# Patient Record
Sex: MALE | Race: WHITE | NOT HISPANIC OR LATINO | Employment: FULL TIME | URBAN - NONMETROPOLITAN AREA
[De-identification: names, ages, dates, MRNs, and addresses within clinical notes are randomized per-mention and may not be internally consistent; named-entity substitution may affect disease eponyms.]

---

## 2020-03-24 ENCOUNTER — OFFICE VISIT (OUTPATIENT)
Dept: UROLOGY | Facility: CLINIC | Age: 55
End: 2020-03-24

## 2020-03-24 VITALS — TEMPERATURE: 98.4 F | WEIGHT: 255.4 LBS | BODY MASS INDEX: 32.78 KG/M2 | HEIGHT: 74 IN

## 2020-03-24 DIAGNOSIS — R10.30 LOWER ABDOMINAL PAIN: ICD-10-CM

## 2020-03-24 DIAGNOSIS — N50.819 PERSISTENT TESTICULAR PAIN: Primary | ICD-10-CM

## 2020-03-24 DIAGNOSIS — R35.0 FREQUENCY OF URINATION: ICD-10-CM

## 2020-03-24 DIAGNOSIS — N42.9 DISORDER OF PROSTATE: ICD-10-CM

## 2020-03-24 LAB
ANION GAP SERPL CALCULATED.3IONS-SCNC: 16.2 MMOL/L (ref 5–15)
BILIRUB BLD-MCNC: NEGATIVE MG/DL
BUN BLD-MCNC: 12 MG/DL (ref 6–20)
BUN/CREAT SERPL: 14 (ref 7–25)
CALCIUM SPEC-SCNC: 9.3 MG/DL (ref 8.6–10.5)
CHLORIDE SERPL-SCNC: 98 MMOL/L (ref 98–107)
CLARITY, POC: CLEAR
CO2 SERPL-SCNC: 21.8 MMOL/L (ref 22–29)
COLOR UR: YELLOW
CREAT BLD-MCNC: 0.86 MG/DL (ref 0.76–1.27)
GFR SERPL CREATININE-BSD FRML MDRD: 93 ML/MIN/1.73
GLUCOSE BLD-MCNC: 149 MG/DL (ref 65–99)
GLUCOSE UR STRIP-MCNC: NEGATIVE MG/DL
KETONES UR QL: NEGATIVE
LEUKOCYTE EST, POC: NEGATIVE
NITRITE UR-MCNC: NEGATIVE MG/ML
PH UR: 6.5 [PH] (ref 5–8)
POTASSIUM BLD-SCNC: 3.6 MMOL/L (ref 3.5–5.2)
PROT UR STRIP-MCNC: NEGATIVE MG/DL
PSA SERPL-MCNC: 1.59 NG/ML (ref 0–4)
RBC # UR STRIP: NEGATIVE /UL
SODIUM BLD-SCNC: 136 MMOL/L (ref 136–145)
SP GR UR: 1.01 (ref 1–1.03)
UROBILINOGEN UR QL: NORMAL

## 2020-03-24 PROCEDURE — 84153 ASSAY OF PSA TOTAL: CPT | Performed by: NURSE PRACTITIONER

## 2020-03-24 PROCEDURE — 80048 BASIC METABOLIC PNL TOTAL CA: CPT | Performed by: NURSE PRACTITIONER

## 2020-03-24 PROCEDURE — 99204 OFFICE O/P NEW MOD 45 MIN: CPT | Performed by: NURSE PRACTITIONER

## 2020-03-24 PROCEDURE — 51798 US URINE CAPACITY MEASURE: CPT | Performed by: NURSE PRACTITIONER

## 2020-03-24 RX ORDER — HYDROCHLOROTHIAZIDE 12.5 MG/1
12.5 TABLET ORAL
COMMUNITY
Start: 2020-03-12 | End: 2020-06-10

## 2020-03-24 RX ORDER — METHYLPREDNISOLONE 4 MG/1
4 TABLET ORAL
COMMUNITY
Start: 2020-01-15 | End: 2020-11-12

## 2020-03-24 RX ORDER — MONTELUKAST SODIUM 10 MG/1
10 TABLET ORAL DAILY
COMMUNITY
Start: 2020-03-12 | End: 2020-11-12

## 2020-03-24 RX ORDER — METHOCARBAMOL 750 MG/1
750 TABLET, FILM COATED ORAL
COMMUNITY
Start: 2020-01-02 | End: 2020-11-12

## 2020-03-24 RX ORDER — IBUPROFEN 800 MG/1
800 TABLET ORAL
COMMUNITY
Start: 2020-01-02 | End: 2020-11-12

## 2020-03-24 RX ORDER — CYCLOBENZAPRINE HCL 10 MG
10 TABLET ORAL
COMMUNITY
Start: 2020-01-02 | End: 2020-11-12

## 2020-03-24 RX ORDER — FLUCONAZOLE 150 MG/1
150 TABLET ORAL
COMMUNITY
Start: 2020-03-12 | End: 2020-06-10

## 2020-03-24 RX ORDER — LOSARTAN POTASSIUM 50 MG/1
50 TABLET ORAL
COMMUNITY
Start: 2020-03-12 | End: 2020-06-10

## 2020-03-24 NOTE — PROGRESS NOTES
"Chief Complaint:          Chief Complaint   Patient presents with   • Testicle Pain     New patient With Prostate Disorder       Testicular Pain / BPH with Urinary Frequency /Abdominal Pain  HPI:   54 y.o. male.  Patient presents to clinic today with numerous complaints. He is requesting a female provider only.  He has been referred by his primary care for testicular  Pain and groin rash. He states his testicular soreness has been ongoing for a \"long time\", and is intermittent. He states it is worse with  [place], and occassionally with episodes of masturbation.     Secondly, He is requesting a prostate exam and prostate massage. He states it is difficult to reach his prostate if he is not down on his knees. He reports perineal discomfort and reports recurrent prostate infections. He states his prostate is \"Sore\", and feels swollen and irritated He wants it examined. Patient does 'Anal stimulation with objects\".  He states he has rectal spasms, and also anal spasms. He has occasional constipation, which makes it worse. He now takes laxatives occasionally.    Thirdly, he reports lower quadrant abdominal pain, pelvic pain/pressure, which is  Also intermittent with episodes of bloating. He states his abdomen is tender to touch. He denies back pain, flank pain, he denies CVA tenderness.     Fourthly,  He reports excessive urinary frequency and urgency, he has occasional urgency incontinence.  He has nocturia 5-7 times, denies dysuria, or burning on urination.  He does not have gross hematuria, his IPSS score is 20, his PVR is 0.  His urine dipstick is completely negative for any infection.  He reports he has had 3- scrotal ultrasounds, states he has significant calcium deposits on his testicles.    He reports a history of blood vessel inflammation on his penis, that had resolved.  He is relatively healthy otherwise with a past medical history of asthma and arthritis. He has a rash to bilateral armpits, and feet, " with toe Nail fungus that he wants me to look at. Discussed referral to Derm and Podiatry.    We will initiate work-up        Past Medical History:        Past Medical History:   Diagnosis Date   • Hypertension      The following portions of the patient's history were reviewed and updated as appropriate: allergies, current medications, past family history, past medical history, past social history, past surgical history and problem list.    Current Meds:     Current Outpatient Medications   Medication Sig Dispense Refill   • cyclobenzaprine (FLEXERIL) 10 MG tablet 10 mg.     • fluconazole (DIFLUCAN) 150 MG tablet 150 mg.     • hydroCHLOROthiazide (HYDRODIURIL) 12.5 MG tablet 12.5 mg.     • ibuprofen (ADVIL,MOTRIN) 800 MG tablet 800 mg.     • losartan (COZAAR) 50 MG tablet 50 mg.     • methocarbamol (ROBAXIN) 750 MG tablet 750 mg.     • methylPREDNISolone (MEDROL, FLORINA,) 4 MG tablet 4 mg.     • montelukast (SINGULAIR) 10 MG tablet Take 10 mg by mouth Daily. FOR 30 DAYS       No current facility-administered medications for this visit.         Allergies:      Allergies   Allergen Reactions   • Penicillins Hives        Past Surgical History:     History reviewed. No pertinent surgical history.      Social History:     Social History     Socioeconomic History   • Marital status:      Spouse name: Not on file   • Number of children: Not on file   • Years of education: Not on file   • Highest education level: Not on file   Tobacco Use   • Smoking status: Never Smoker   • Smokeless tobacco: Never Used   Substance and Sexual Activity   • Alcohol use: Never     Frequency: Never   • Drug use: Never   • Sexual activity: Defer       Family History:     Family History   Problem Relation Age of Onset   • Heart disease Father    • Hypertension Father    • Heart disease Mother    • Hypertension Mother    • Cancer Mother        Review of Systems:     Review of Systems   Constitutional: Negative for activity change, appetite  change, chills, fatigue and fever.   HENT: Negative for congestion and sinus pressure.    Eyes: Negative for blurred vision and double vision.   Respiratory: Negative for shortness of breath and wheezing.    Gastrointestinal: Positive for abdominal pain and constipation. Negative for diarrhea, nausea and vomiting.   Genitourinary: Positive for frequency and urgency. Negative for difficulty urinating, discharge, dysuria, flank pain, genital sores, hematuria, penile pain, penile swelling, scrotal swelling, testicular pain and urinary incontinence.   Musculoskeletal: Negative for back pain.   Skin: Positive for dry skin, pallor and rash (armpits, foot).   Neurological: Negative for dizziness, weakness and confusion.   Psychiatric/Behavioral: Positive for stress. Negative for agitation, behavioral problems and decreased concentration. The patient is nervous/anxious.         Physical Exam:     Physical Exam   Constitutional: He is oriented to person, place, and time. He appears well-developed and well-nourished. No distress.   HENT:   Head: Normocephalic and atraumatic.   Right Ear: External ear normal.   Left Ear: External ear normal.   Eyes: Pupils are equal, round, and reactive to light. Conjunctivae and EOM are normal. Right eye exhibits no discharge. Left eye exhibits no discharge.   Neck: Normal range of motion. Neck supple. No tracheal deviation present. No thyromegaly present.   Cardiovascular: Normal rate and regular rhythm. Exam reveals no friction rub.   No murmur heard.  Pulmonary/Chest: Effort normal and breath sounds normal. No stridor. No respiratory distress.   Abdominal: Soft. Bowel sounds are normal. He exhibits no distension. There is tenderness. There is no guarding.   Lower quadrant abdominal pain   Genitourinary: Rectum normal, testes normal and penis normal. Rectal exam shows guaiac negative stool. Uncircumcised. No penile tenderness. No discharge found.   Genitourinary Comments: KEISHA negative for  nodules, He has good rectal tone, and large smooth firm prostate. There is no nodularity or any suspicious rectal abnormalities.  Generalised testicular pain/tenderness otherwise normal external genitalia. Bilateral descended testes without any masses, Right slightly swollen and hanging lower than the Leftt testicles. There are no inguinal hernias or abnormalities noted.      Musculoskeletal: Normal range of motion. He exhibits no edema, tenderness or deformity.   Neurological: He is alert and oriented to person, place, and time. No cranial nerve deficit. Coordination normal.   Skin: Skin is warm and dry. Capillary refill takes less than 2 seconds. No pallor.   Psychiatric: He has a normal mood and affect. His behavior is normal. Judgment and thought content normal.       IPSS Questionnaire (AUA-7):  Over the past month…    1)  How often have you had a sensation of not emptying your bladder completely after you finish urinating?  3 - About half the time   2)  How often have you had to urinate again less than two hours after you finished urinating? 3 - About half the time   3)  How often have you found you stopped and started again several times when you urinated?  1 - Less than 1 time in 5   4) How difficult have you found it to postpone urination?  3 - About half the time   5) How often have you had a weak urinary stream?  3 - About half the time   6) How often have you had to push or strain to begin urination?  3 - About half the time   7) How many times did you most typically get up to urinate from the time you went to bed until the time you got up in the morning?  4 - 4 times   Total score:  0-7 mildly symptomatic    8-19 moderately symptomatic    20-35 severely symptomatic                                                    20         Procedure:       Assessment/Plan:     Testicular Pain / BPH with Urinary Frequency /Abdominal Pain: Patient reports to clinic today with numerous complaints.  His IPSS score is 20,  his PVR is 0.  His urine dipstick is completely negative for any infection.  He reports he has had 3- scrotal ultrasounds, states he has significant calcium deposits on his testicles.    He does not have any testicular pain/discomfort today upon exam. He does not have any visible groin rash. He reports this has recently both resolved. Requested a copy of his recent scrotal ultrasound from his primary care for review.    Benign Prostatic Hypertrophy With UrinaryFrequency: Patient reports urinary symptoms that are bothersome to him.  We discussed the pathophysiology of BPH and obstruction. We discussed the static and dynamic effects of BPH as well as using 5 alpha reductase inhibitors versus alpha blockade.  We discussed the indications for transurethral surgery as well.  And/ or other therapeutic options available including all of the newer techniques.  Patient does not have any documented PSA on file, and his KEISHA is negative for nodularity. He however has an enlarged prostate.    Discussed  Starting Flomax, patient wants to wait.    Discussed lower tract and upper tract investigation.     Pt has been scheduled for a CT scan of Abdomen/pelvis with/Without contrast    Patient Has been schedule for a Cystoscopy with Dr. Ward on 04/02/2020    Patient reports that he is not currently experiencing any symptoms of urinary incontinence.    Patient's Body mass index is 32.79 kg/m². BMI is above normal parameters. Recommendations include: educational material, exercise counseling and nutrition counseling.    Smoking Cessation Counseling:  Never a smoker.  Patient does not currently use any tobacco products.       Counseling was given to patient for the following topics diagnostic results including: Testicular pain, abdominal pain, BPH with urinary frequency and impressions as follows: Cystoscopy, abdominal/pelvic CT, repeat scrotal ultrasound.. The interim medical history and current results were reviewed.  A treatment  plan with follow-up was made for Persistent testicular pain [N50.9].            This document has been electronically signed by Griselda Cheng-Akwa, APRN March 25, 2020 09:12

## 2020-03-25 PROBLEM — R10.30 LOWER ABDOMINAL PAIN: Status: ACTIVE | Noted: 2020-03-25

## 2020-03-25 PROBLEM — N42.9 DISORDER OF PROSTATE: Status: ACTIVE | Noted: 2020-03-25

## 2020-03-25 PROBLEM — R35.0 FREQUENCY OF URINATION: Status: ACTIVE | Noted: 2020-03-25

## 2020-03-25 PROBLEM — N50.819 PERSISTENT TESTICULAR PAIN: Status: ACTIVE | Noted: 2020-03-25

## 2020-03-26 NOTE — PATIENT INSTRUCTIONS
Prostatitis    Prostatitis is swelling of the prostate gland. The prostate helps to make semen. It is below a man's bladder, in front of the rectum. There are different types of prostatitis.  Follow these instructions at home:    · Take over-the-counter and prescription medicines only as told by your doctor.  · If you were prescribed an antibiotic medicine, take it as told by your doctor. Do not stop taking the antibiotic even if you start to feel better.  · If your doctor prescribed exercises, do them as directed.  · Take sitz baths as told by your doctor. To take a sitz bath, sit in warm water that is deep enough to cover your hips and butt.  · Keep all follow-up visits as told by your doctor. This is important.  Contact a doctor if:  · Your symptoms get worse.  · You have a fever.  Get help right away if:  · You have chills.  · You feel sick to your stomach (nauseous).  · You throw up (vomit).  · You feel light-headed.  · You feel like you might pass out (faint).  · You cannot pee (urinate).  · You have blood or clumps of blood (blood clots) in your pee (urine).  This information is not intended to replace advice given to you by your health care provider. Make sure you discuss any questions you have with your health care provider.  Document Released: 06/18/2013 Document Revised: 09/07/2017 Document Reviewed: 09/07/2017  [employer] Interactive Patient Education © 2020 [employer]  Urinary Frequency, Adult  Urinary frequency means urinating more often than usual. You may urinate every 1-2 hours even though you drink a normal amount of fluid and do not have a bladder infection or condition. Although you urinate more often than normal, the total amount of urine produced in a day is normal.  With urinary frequency, you may have an urgent need to urinate often. The stress and anxiety of needing to find a bathroom quickly can make this urge worse. This condition may go away on its own or you may need treatment at home.  Home treatment may include bladder training, exercises, taking medicines, or making changes to your diet.  Follow these instructions at home:  Bladder health    · Keep a bladder diary if told by your health care provider. Keep track of:  ? What you eat and drink.  ? How often you urinate.  ? How much you urinate.  · Follow a bladder training program if told by your health care provider. This may include:  ? Learning to delay going to the bathroom.  ? Double urinating (voiding). This helps if you are not completely emptying your bladder.  ? Scheduled voiding.  · Do Kegel exercises as told by your health care provider. Kegel exercises strengthen the muscles that help control urination, which may help the condition.  Eating and drinking  · If told by your health care provider, make diet changes, such as:  ? Avoiding caffeine.  ? Drinking fewer fluids, especially alcohol.  ? Not drinking in the evening.  ? Avoiding foods or drinks that may irritate the bladder. These include coffee, tea, soda, artificial sweeteners, citrus, tomato-based foods, and chocolate.  ? Eating foods that help prevent or ease constipation. Constipation can make this condition worse. Your health care provider may recommend that you:  § Drink enough fluid to keep your urine pale yellow.  § Take over-the-counter or prescription medicines.  § Eat foods that are high in fiber, such as beans, whole grains, and fresh fruits and vegetables.  § Limit foods that are high in fat and processed sugars, such as fried or sweet foods.  General instructions  · Take over-the-counter and prescription medicines only as told by your health care provider.  · Keep all follow-up visits as told by your health care provider. This is important.  Contact a health care provider if:  · You start urinating more often.  · You feel pain or irritation when you urinate.  · You notice blood in your urine.  · Your urine looks cloudy.  · You develop a fever.  · You begin  vomiting.  Get help right away if:  · You are unable to urinate.  Summary  · Urinary frequency means urinating more often than usual. With urinary frequency, you may urinate every 1-2 hours even though you drink a normal amount of fluid and do not have a bladder infection or other bladder condition.  · Your health care provider may recommend that you keep a bladder diary, follow a bladder training program, or make dietary changes.  · If told by your health care provider, do Kegel exercises to strengthen the muscles that help control urination.  · Take over-the-counter and prescription medicines only as told by your health care provider.  · Contact a health care provider if your symptoms do not improve or get worse.  This information is not intended to replace advice given to you by your health care provider. Make sure you discuss any questions you have with your health care provider.  Document Released: 10/14/2010 Document Revised: 06/27/2019 Document Reviewed: 06/27/2019  [employer] Interactive Patient Education © 2020 [employer]  Benign Prostatic Hyperplasia    Benign prostatic hyperplasia (BPH) is an enlarged prostate gland that is caused by the normal aging process and not by cancer. The prostate is a walnut-sized gland that is involved in the production of semen. It is located in front of the rectum and below the bladder. The bladder stores urine and the urethra is the tube that carries the urine out of the body. The prostate may get bigger as a man gets older.  An enlarged prostate can press on the urethra. This can make it harder to pass urine. The build-up of urine in the bladder can cause infection. Back pressure and infection may progress to bladder damage and kidney (renal) failure.  What are the causes?  This condition is part of a normal aging process. However, not all men develop problems from this condition. If the prostate enlarges away from the urethra, urine flow will not be blocked. If it  enlarges toward the urethra and compresses it, there will be problems passing urine.  What increases the risk?  This condition is more likely to develop in men over the age of 50 years.  What are the signs or symptoms?  Symptoms of this condition include:  · Getting up often during the night to urinate.  · Needing to urinate frequently during the day.  · Difficulty starting urine flow.  · Decrease in size and strength of your urine stream.  · Leaking (dribbling) after urinating.  · Inability to pass urine. This needs immediate treatment.  · Inability to completely empty your bladder.  · Pain when you pass urine. This is more common if there is also an infection.  · Urinary tract infection (UTI).  How is this diagnosed?  This condition is diagnosed based on your medical history, a physical exam, and your symptoms. Tests will also be done, such as:  · A post-void bladder scan. This measures any amount of urine that may remain in your bladder after you finish urinating.  · A digital rectal exam. In a rectal exam, your health care provider checks your prostate by putting a lubricated, gloved finger into your rectum to feel the back of your prostate gland. This exam detects the size of your gland and any abnormal lumps or growths.  · An exam of your urine (urinalysis).  · A prostate specific antigen (PSA) screening. This is a blood test used to screen for prostate cancer.  · An ultrasound. This test uses sound waves to electronically produce a picture of your prostate gland.  Your health care provider may refer you to a specialist in kidney and prostate diseases (urologist).  How is this treated?  Once symptoms begin, your health care provider will monitor your condition (active surveillance or watchful waiting). Treatment for this condition will depend on the severity of your condition. Treatment may include:  · Observation and yearly exams. This may be the only treatment needed if your condition and symptoms are  mild.  · Medicines to relieve your symptoms, including:  ? Medicines to shrink the prostate.  ? Medicines to relax the muscle of the prostate.  · Surgery in severe cases. Surgery may include:  ? Prostatectomy. In this procedure, the prostate tissue is removed completely through an open incision or with a laparoscope or robotics.  ? Transurethral resection of the prostate (TURP). In this procedure, a tool is inserted through the opening at the tip of the penis (urethra). It is used to cut away tissue of the inner core of the prostate. The pieces are removed through the same opening of the penis. This removes the blockage.  ? Transurethral incision (TUIP). In this procedure, small cuts are made in the prostate. This lessens the prostate's pressure on the urethra.  ? Transurethral microwave thermotherapy (TUMT). This procedure uses microwaves to create heat. The heat destroys and removes a small amount of prostate tissue.  ? Transurethral needle ablation (TUNA). This procedure uses radio frequencies to destroy and remove a small amount of prostate tissue.  ? Interstitial laser coagulation (ILC). This procedure uses a laser to destroy and remove a small amount of prostate tissue.  ? Transurethral electrovaporization (TUVP). This procedure uses electrodes to destroy and remove a small amount of prostate tissue.  ? Prostatic urethral lift. This procedure inserts an implant to push the lobes of the prostate away from the urethra.  Follow these instructions at home:  · Take over-the-counter and prescription medicines only as told by your health care provider.  · Monitor your symptoms for any changes. Contact your health care provider with any changes.  · Avoid drinking large amounts of liquid before going to bed or out in public.  · Avoid or reduce how much caffeine or alcohol you drink.  · Give yourself time when you urinate.  · Keep all follow-up visits as told by your health care provider. This is important.  Contact a  health care provider if:  · You have unexplained back pain.  · Your symptoms do not get better with treatment.  · You develop side effects from the medicine you are taking.  · Your urine becomes very dark or has a bad smell.  · Your lower abdomen becomes distended and you have trouble passing your urine.  Get help right away if:  · You have a fever or chills.  · You suddenly cannot urinate.  · You feel lightheaded, or very dizzy, or you faint.  · There are large amounts of blood or clots in the urine.  · Your urinary problems become hard to manage.  · You develop moderate to severe low back or flank pain. The flank is the side of your body between the ribs and the hip.  These symptoms may represent a serious problem that is an emergency. Do not wait to see if the symptoms will go away. Get medical help right away. Call your local emergency services (911 in the [place] Do not drive yourself to the hospital.  Summary  · Benign prostatic hyperplasia (BPH) is an enlarged prostate that is caused by the normal aging process and not by cancer.  · An enlarged prostate can press on the urethra. This can make it hard to pass urine.  · This condition is part of a normal aging process and is more likely to develop in men over the age of 50 years.  · Get help right away if you suddenly cannot urinate.  This information is not intended to replace advice given to you by your health care provider. Make sure you discuss any questions you have with your health care provider.  Document Released: 12/18/2006 Document Revised: 11/12/2019 Document Reviewed: 01/22/2018  [employer] Interactive Patient Education © 2020 [employer]

## 2020-03-27 ENCOUNTER — APPOINTMENT (OUTPATIENT)
Dept: CT IMAGING | Facility: HOSPITAL | Age: 55
End: 2020-03-27

## 2020-04-10 ENCOUNTER — TELEPHONE (OUTPATIENT)
Dept: UROLOGY | Facility: CLINIC | Age: 55
End: 2020-04-10

## 2020-04-14 NOTE — TELEPHONE ENCOUNTER
Got an e-mail message back from scheduling team in [place] and it was approved and they are going to contact patient to get him scheduled.

## 2020-05-05 ENCOUNTER — HOSPITAL ENCOUNTER (OUTPATIENT)
Dept: CT IMAGING | Facility: HOSPITAL | Age: 55
Discharge: HOME | End: 2020-05-05
Admitting: NURSE PRACTITIONER

## 2020-05-05 DIAGNOSIS — R10.30 LOWER ABDOMINAL PAIN: ICD-10-CM

## 2020-05-05 DIAGNOSIS — N42.9 DISORDER OF PROSTATE: ICD-10-CM

## 2020-05-05 PROCEDURE — 0 IOVERSOL 68 % SOLUTION: Performed by: NURSE PRACTITIONER

## 2020-05-05 PROCEDURE — 74178 CT ABD&PLV WO CNTR FLWD CNTR: CPT | Performed by: RADIOLOGY

## 2020-05-05 PROCEDURE — 74178 CT ABD&PLV WO CNTR FLWD CNTR: CPT

## 2020-05-05 RX ADMIN — IOVERSOL 100 ML: 678 INJECTION INTRA-ARTERIAL; INTRAVENOUS at 13:15

## 2020-05-07 ENCOUNTER — TELEPHONE (OUTPATIENT)
Dept: UROLOGY | Facility: CLINIC | Age: 55
End: 2020-05-07

## 2020-05-11 ENCOUNTER — TELEPHONE (OUTPATIENT)
Dept: UROLOGY | Facility: CLINIC | Age: 55
End: 2020-05-11

## 2020-05-11 NOTE — TELEPHONE ENCOUNTER
Patient called to review CT scan results. We discussed the presence of bilateral non obstructing stones, and a fatty liver.     Pt reports increased abdominal distention and bloating with each meal. Discussed a GI referral.    Pt appreciative of care. Will call with any questions.  shanta Newby

## 2020-05-26 ENCOUNTER — OFFICE VISIT (OUTPATIENT)
Dept: GASTROENTEROLOGY | Facility: CLINIC | Age: 55
End: 2020-05-26

## 2020-05-26 VITALS
WEIGHT: 263.4 LBS | TEMPERATURE: 97.9 F | BODY MASS INDEX: 33.8 KG/M2 | OXYGEN SATURATION: 97 % | DIASTOLIC BLOOD PRESSURE: 96 MMHG | HEART RATE: 81 BPM | SYSTOLIC BLOOD PRESSURE: 180 MMHG | HEIGHT: 74 IN

## 2020-05-26 DIAGNOSIS — R19.4 CHANGE IN BOWEL HABITS: ICD-10-CM

## 2020-05-26 DIAGNOSIS — R68.81 EARLY SATIETY: ICD-10-CM

## 2020-05-26 DIAGNOSIS — R10.84 GENERALIZED ABDOMINAL PAIN: Primary | ICD-10-CM

## 2020-05-26 DIAGNOSIS — R14.0 BLOATING: ICD-10-CM

## 2020-05-26 DIAGNOSIS — L29.0 PRURITUS ANI: ICD-10-CM

## 2020-05-26 DIAGNOSIS — K76.0 FATTY LIVER: ICD-10-CM

## 2020-05-26 DIAGNOSIS — K63.89 SMALL INTESTINAL BACTERIAL OVERGROWTH: ICD-10-CM

## 2020-05-26 PROCEDURE — 99243 OFF/OP CNSLTJ NEW/EST LOW 30: CPT | Performed by: PHYSICIAN ASSISTANT

## 2020-05-26 RX ORDER — POLYETHYLENE GLYCOL 3350 17 G/17G
17 POWDER, FOR SOLUTION ORAL AS NEEDED
COMMUNITY
End: 2022-11-28

## 2020-05-26 RX ORDER — NITAZOXANIDE 500 MG/1
500 TABLET ORAL 2 TIMES DAILY WITH MEALS
Qty: 20 TABLET | Refills: 0 | Status: SHIPPED | OUTPATIENT
Start: 2020-05-26 | End: 2020-06-05

## 2020-05-26 NOTE — PROGRESS NOTES
": 1965    Chief Complaint   Patient presents with   • Abdominal Pain       Mukesh Mccord is a 55 y.o. male who presents to the office today as a consultation from Griselda Cheng-Akwa, APRN for evaluation of Abdominal Pain.    History of Present Illness:  Over the past 6 weeks ago, he noticed abdominal pain starting in the lower abdomen and now has moved to generalized abdomen. His bowel movements are described as occurring daily, but feels that he has air in sections which causes him to be uncomfortable. Has large amount of air with his bowel movements and has some relief when it passes. He points to #6 on the Kelly Stool Scale. No rectal bleeding. Has been told in the past that he has internal hemorrhoids. He admits leakage of feces after a \"hard\" bowel movement throughout the day but denies fecal urgency with full incontinence. Has intermittent rectal itching. Takes miralax only as needed for constipation. He has never had EGD, fearful of sedation. Denies nausea or vomiting. Has heartburn, he feels that it is becoming better now with TUMs as needed, only occurs intermittently not daily. Has bloating and eats much less than previous, has early satiety. Eating a small meal makes him feel very full. Did have stool testing at his PCPs office recently which was negative.     New finding of fatty liver on recent CT scan. No personal history of other liver diseases. He does not have diabetes or elevated cholesterol per his report. He does not drink alcohol and has never had alcoholism.     Last colonoscopy was 5 years ago by Dr. Sharma which showed colon polyps. There is no known family history of colon cancer or colon polyps. There is some family history of stomach cancer, paternal grandfather.     Review of Systems   Constitutional: Positive for activity change, fatigue and unexpected weight change. Negative for chills.   HENT: Negative for sore throat and trouble swallowing.    Eyes: Negative.  "   Respiratory: Positive for shortness of breath. Negative for cough and chest tightness.    Cardiovascular: Positive for leg swelling.   Gastrointestinal: Positive for abdominal distention, abdominal pain, constipation, nausea and rectal pain. Negative for anal bleeding, blood in stool, diarrhea and vomiting.   Endocrine: Negative.    Genitourinary: Positive for difficulty urinating, frequency, hematuria and penile pain.   Musculoskeletal: Positive for back pain and neck pain.   Skin: Positive for rash.   Allergic/Immunologic: Negative for environmental allergies and food allergies.   Neurological: Negative for dizziness and headaches.   Hematological: Bruises/bleeds easily.   Psychiatric/Behavioral: Positive for agitation and sleep disturbance. The patient is nervous/anxious.      I have reviewed and confirmed the accuracy of the ROS as documented by the MA/LPN/RN Carmina Toribio PA-C    Past Medical History:   Diagnosis Date   • Hypertension        Past Surgical History:   Procedure Laterality Date   • COLONOSCOPY  2015    Dr. jack- colon polyps, did not have sedation due to fear       Family History   Problem Relation Age of Onset   • Heart disease Father    • Hypertension Father    • Heart disease Mother    • Hypertension Mother    • Cancer Mother        Social History     Socioeconomic History   • Marital status:      Spouse name: Not on file   • Number of children: Not on file   • Years of education: Not on file   • Highest education level: Not on file   Tobacco Use   • Smoking status: Never Smoker   • Smokeless tobacco: Never Used   Substance and Sexual Activity   • Alcohol use: Never     Frequency: Never   • Drug use: Never   • Sexual activity: Defer       Current Outpatient Medications:   •  diclofenac (VOLTAREN) 1 % gel gel, Apply 4 g topically to the appropriate area as directed 4 (Four) Times a Day As Needed., Disp: , Rfl:   •  fluconazole (DIFLUCAN) 150 MG tablet, 150 mg., Disp: , Rfl:   •   "hydroCHLOROthiazide (HYDRODIURIL) 12.5 MG tablet, 12.5 mg., Disp: , Rfl:   •  ibuprofen (ADVIL,MOTRIN) 800 MG tablet, 800 mg., Disp: , Rfl:   •  losartan (COZAAR) 50 MG tablet, 50 mg., Disp: , Rfl:   •  methocarbamol (ROBAXIN) 750 MG tablet, 750 mg., Disp: , Rfl:   •  metroNIDAZOLE (METROCREAM) 0.75 % cream, Apply  topically to the appropriate area as directed 2 (Two) Times a Day., Disp: , Rfl:   •  montelukast (SINGULAIR) 10 MG tablet, Take 10 mg by mouth Daily. FOR 30 DAYS, Disp: , Rfl:   •  mupirocin (BACTROBAN) 2 % ointment, Apply  topically to the appropriate area as directed 3 (Three) Times a Day., Disp: , Rfl:   •  cyclobenzaprine (FLEXERIL) 10 MG tablet, 10 mg., Disp: , Rfl:   •  methylPREDNISolone (MEDROL, FLORINA,) 4 MG tablet, 4 mg., Disp: , Rfl:     Allergies:   Penicillins    Vitals:  /96   Pulse 81   Temp 97.9 °F (36.6 °C)   Ht 188 cm (74\")   Wt 119 kg (263 lb 6.4 oz)   SpO2 97%   BMI 33.82 kg/m²     Physical Exam   Constitutional: He is oriented to person, place, and time. He appears well-developed and well-nourished. No distress.   HENT:   Head: Normocephalic and atraumatic.   Wearing a mask   Eyes: Conjunctivae are normal. Right eye exhibits no discharge. Left eye exhibits no discharge. No scleral icterus.   Neck: Normal range of motion. No JVD present.   Cardiovascular: Normal rate, regular rhythm and normal heart sounds. Exam reveals no gallop and no friction rub.   No murmur heard.  Pulmonary/Chest: Effort normal and breath sounds normal. No respiratory distress. He has no wheezes. He has no rales. He exhibits no tenderness.   Abdominal: Soft. Bowel sounds are normal. He exhibits no mass. There is no tenderness.   Obese   Musculoskeletal: Normal range of motion. He exhibits no edema or deformity.   Neurological: He is alert and oriented to person, place, and time. Coordination normal.   Skin: Skin is warm and dry. No rash noted. He is not diaphoretic. No erythema.   Psychiatric: His " behavior is normal. Judgment and thought content normal.   Anxious affect   Vitals reviewed.    Results Review:  Labs 2/2020: H/H normal, WBC normal, plt 170,000, ALT 37, AST 21, Bilirubin 0.4, Alk phos 93, Acute hepatitis panel negative, Hgb A1c 5.5, glucose 117, GFR 88.     CT abd/pelv 5/5/2020: fatty liver, GI normal, bilateral nephrolithiasis without obstruction.     Assessment:  1. Generalized abdominal pain    2. Bloating    3. Early satiety    4. Change in bowel habits    5. Pruritus ani    6. Small intestinal bacterial overgrowth    7. Fatty liver      Plan:  Offered EGD and colonoscopy for evaluation of complaints and because he has history of colon polyps. He would like to think about this.    Due to non-specific GI complaints and recent normal CT scan of the abdomen and pelvis, I have recommended that he have treatment for suspected SIBO with Alinia as below. He will also start daily probiotic, culturelle samples/coupons given.     Will monitor pruritus ani, no severe complaints now, only intermittent, may need anusol cream in future.    We will obtain records from Dr. Sharma for review of past colonoscopy and pathology.    Mukesh Leonhart was advised of the importance of weight loss due to finding of fatty liver infiltration. Body mass index is 33 and his goal would be a BMI of 25 or less. He was advised to lose 10% of his body weight over the next 6 months. This is to be accomplished with a healthy diet (hypocaloric diet) and exercise. Replace fat with complex carbs and work on increasing fiber in the diet with more whole grains, fruits and vegetables. He was informed of the importance of good control of his glucose and cholesterol to help decrease his fatty liver infiltrate. Avoid all alcohol intake. He was advised that fatty infiltrate of the liver is not a benign condition and can lead to serious liver disease, even cirrhosis, possible liver transplant and hepatocellular carcinoma. He will have  ultrasound of the liver again if liver enzymes increase and hepatic panel every 6 months for monitoring. Most recent liver enzymes normal.     New Medications Ordered This Visit   Medications   • nitazoxanide (Alinia) 500 MG tablet     Sig: Take 1 tablet by mouth 2 (Two) Times a Day With Meals for 10 days.     Dispense:  20 tablet     Refill:  0           Return in about 1 month (around 6/26/2020) for recheck abdominal pain.      Electronically signed 5/26/2020 10:06  Carmina Toribio PA-C, [facility]

## 2020-06-01 NOTE — TELEPHONE ENCOUNTER
SPOKE WITH PATIENT RELATEDS TO URINE CULTURE RESULTS AND LABS. NO CONCERNS AT THIS TIME  VIRI PERKINS

## 2020-06-09 ENCOUNTER — TELEPHONE (OUTPATIENT)
Dept: UROLOGY | Facility: CLINIC | Age: 55
End: 2020-06-09

## 2020-06-09 NOTE — TELEPHONE ENCOUNTER
Pt states that he has a rash on his scrotum and anal area. Requesting generic Diflucan be called  into [facility] in [place].     Pts number is [phone]

## 2020-06-10 ENCOUNTER — OFFICE VISIT (OUTPATIENT)
Dept: UROLOGY | Facility: CLINIC | Age: 55
End: 2020-06-10

## 2020-06-10 VITALS — WEIGHT: 264 LBS | BODY MASS INDEX: 33.88 KG/M2 | HEIGHT: 74 IN | TEMPERATURE: 98.6 F

## 2020-06-10 DIAGNOSIS — R35.0 FREQUENCY OF URINATION: ICD-10-CM

## 2020-06-10 DIAGNOSIS — N50.819 PERSISTENT TESTICULAR PAIN: Primary | ICD-10-CM

## 2020-06-10 DIAGNOSIS — B37.49 YEAST DERMATITIS OF PENIS: ICD-10-CM

## 2020-06-10 LAB
BILIRUB BLD-MCNC: NEGATIVE MG/DL
CLARITY, POC: CLEAR
COLOR UR: YELLOW
GLUCOSE UR STRIP-MCNC: NEGATIVE MG/DL
KETONES UR QL: NEGATIVE
LEUKOCYTE EST, POC: NEGATIVE
NITRITE UR-MCNC: NEGATIVE MG/ML
PH UR: 6.5 [PH] (ref 5–8)
PROT UR STRIP-MCNC: NEGATIVE MG/DL
RBC # UR STRIP: NEGATIVE /UL
SP GR UR: 1.02 (ref 1–1.03)
UROBILINOGEN UR QL: NORMAL

## 2020-06-10 PROCEDURE — 99214 OFFICE O/P EST MOD 30 MIN: CPT | Performed by: NURSE PRACTITIONER

## 2020-06-10 RX ORDER — FLUCONAZOLE 100 MG/1
100 TABLET ORAL DAILY
Qty: 5 TABLET | Refills: 0 | Status: SHIPPED | OUTPATIENT
Start: 2020-06-10 | End: 2020-06-15

## 2020-06-10 RX ORDER — LOSARTAN POTASSIUM AND HYDROCHLOROTHIAZIDE 12.5; 5 MG/1; MG/1
1 TABLET ORAL DAILY
COMMUNITY
Start: 2020-04-09

## 2020-06-10 NOTE — PROGRESS NOTES
Chief Complaint:          Chief Complaint   Patient presents with   • Testicle Pain     3 mnth f/u       HPI:   55 y.o. male.  Patient presents to clinic today for follow-up.    He states he is doing relatively better, and he reports his groin pain is now a dull ache  Which is inconsistent. He reports an occasional  testicular ache and he feels a lot of friction when he walks or during activity.  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  Overall his testicular pain is better as he does not masturbate often.    He also reports perineal rash which has been yeas tlike.  He has numerous antifungal creams prescribed by his primary care which he is not using because he constantly soiled his undergarments and sheets.  He is requesting a p.o. Diflucan, as he has had better results with that compared to the creams.  He says most often he has to use his fan to air his perineal area and for comfort.    Patient is also requesting a prostate massage.  He denies having any more urinary symptoms of frequency urgency, dysuria or burning on urination.  He denies gross hematuria, chills or fevers, he denies back pain, abdominal pain.  His urine dipstick today is completely negative for any infection. His IPSS score is 3      Past Medical History:        Past Medical History:   Diagnosis Date   • Hypertension      The following portions of the patient's history were reviewed and updated as appropriate: allergies, current medications, past family history, past medical history, past social history, past surgical history and problem list.    Current Meds:     Current Outpatient Medications   Medication Sig Dispense Refill   • cyclobenzaprine (FLEXERIL) 10 MG tablet 10 mg.     • diclofenac (VOLTAREN) 1 % gel gel Apply 4 g topically to the appropriate area as directed 4 (Four) Times a Day As Needed.     •  ibuprofen (ADVIL,MOTRIN) 800 MG tablet 800 mg.     • losartan-hydrochlorothiazide (HYZAAR) 50-12.5 MG per tablet Take 1 tablet by mouth Daily. FOR 30 DAYS     • methocarbamol (ROBAXIN) 750 MG tablet 750 mg.     • methylPREDNISolone (MEDROL, FLORINA,) 4 MG tablet 4 mg.     • metroNIDAZOLE (METROCREAM) 0.75 % cream Apply  topically to the appropriate area as directed 2 (Two) Times a Day.     • montelukast (SINGULAIR) 10 MG tablet Take 10 mg by mouth Daily. FOR 30 DAYS     • mupirocin (BACTROBAN) 2 % ointment Apply  topically to the appropriate area as directed 3 (Three) Times a Day.     • polyethylene glycol (MIRALAX) 17 GM/SCOOP powder Take 17 g by mouth As Needed for Constipation.     • fluconazole (Diflucan) 100 MG tablet Take 1 tablet by mouth Daily for 5 days. 5 tablet 0     No current facility-administered medications for this visit.         Allergies:      Allergies   Allergen Reactions   • Penicillins Hives        Past Surgical History:     Past Surgical History:   Procedure Laterality Date   • COLONOSCOPY  2015    Dr. jack- colon polyps, did not have sedation due to fear         Social History:     Social History     Socioeconomic History   • Marital status:      Spouse name: Not on file   • Number of children: Not on file   • Years of education: Not on file   • Highest education level: Not on file   Tobacco Use   • Smoking status: Never Smoker   • Smokeless tobacco: Never Used   Substance and Sexual Activity   • Alcohol use: Never     Frequency: Never   • Drug use: Never   • Sexual activity: Defer       Family History:     Family History   Problem Relation Age of Onset   • Heart disease Father    • Hypertension Father    • Heart disease Mother    • Hypertension Mother    • Cancer Mother        Review of Systems:    Review of Systems   Constitutional: Negative for activity change, appetite change, chills, fatigue and fever.   HENT: Negative for congestion and sinus pressure.    Eyes: Negative for blurred  vision and double vision.   Respiratory: Negative for shortness of breath and wheezing.    Gastrointestinal: Negative for abdominal pain, constipation, diarrhea, nausea and vomiting.   Genitourinary: Positive for flank pain and testicular pain. Negative for difficulty urinating, discharge, dysuria, frequency, genital sores, hematuria, penile pain, penile swelling, scrotal swelling, urgency and urinary incontinence.   Musculoskeletal: Negative for back pain.   Neurological: Negative for dizziness, weakness and confusion.   Psychiatric/Behavioral: Positive for stress. Negative for agitation, behavioral problems and decreased concentration.      IPSS Questionnaire (AUA-7):  Over the past month…    1)  How often have you had a sensation of not emptying your bladder completely after you finish urinating?  1 - Less than 1 time in 5   2)  How often have you had to urinate again less than two hours after you finished urinating? 1 - Less than 1 time in 5   3)  How often have you found you stopped and started again several times when you urinated?  0 - Not at all   4) How difficult have you found it to postpone urination?  0 - Not at all   5) How often have you had a weak urinary stream?  0 - Not at all   6) How often have you had to push or strain to begin urination?  0 - Not at all   7) How many times did you most typically get up to urinate from the time you went to bed until the time you got up in the morning?  1 - 1 time   Total score:  0-7 mildly symptomatic                                                            3    8-19 moderately symptomatic    20-35 severely symptomatic       Physical Exam:     Physical Exam   Constitutional: He is oriented to person, place, and time. He appears well-developed and well-nourished. He appears distressed.   HENT:   Head: Normocephalic and atraumatic.   Right Ear: External ear normal.   Left Ear: External ear normal.   Eyes: Pupils are equal, round, and reactive to light.  Conjunctivae and EOM are normal. Right eye exhibits no discharge. Left eye exhibits no discharge.   Neck: Normal range of motion. Neck supple. No tracheal deviation present. No thyromegaly present.   Cardiovascular: Normal rate and regular rhythm. Exam reveals no friction rub.   No murmur heard.  Pulmonary/Chest: Effort normal and breath sounds normal. No stridor. No respiratory distress.   Abdominal: Soft. Bowel sounds are normal. He exhibits no distension. There is tenderness. There is no guarding.   Genitourinary: Rectum normal, testes normal and penis normal. Rectal exam shows guaiac negative stool. Uncircumcised. No penile tenderness. No discharge found.   Genitourinary Comments: Perineal rash     Musculoskeletal: Normal range of motion. He exhibits tenderness. He exhibits no edema or deformity.   Neurological: He is alert and oriented to person, place, and time. No cranial nerve deficit. Coordination normal.   Skin: Skin is warm and dry. Capillary refill takes less than 2 seconds. No pallor.   Psychiatric: He has a normal mood and affect. His behavior is normal. Judgment and thought content normal.       Procedure:     No notes on file      Assessment:     Encounter Diagnoses   Name Primary?   • Persistent testicular pain Yes   • Yeast dermatitis of penis    • Frequency of urination      Orders Placed This Encounter   Procedures   • POC Urinalysis Dipstick, Automated       Plan:     Testicular Pain / BPH with Urinary Frequency : Patient reports to clinic today in no apparent distress.  His IPSS score is 3, significantly improved from last time which was 20. His urine dipstick is completely negative for any infection.  He reports he has had 3- scrotal ultrasounds, states he has significant calcium deposits on his testicles.    Patient denies having any urinary symptoms recently.  His urine dipstick is completely negative for any infection.  Patient last cystoscopy and CT scan were unremarkable.    He does  have  only mild/tolerable testicular pain/discomfort today upon exam. He does not have any visible groin rash. He reports this has recently both resolved.  Patient however still requesting some Diflucan.    Overall he reports doing relatively well.    Discussed continues Flomax daily.    Diflucan 100 100 mg p.o. x3 days then stop.      Discussed using nystatin powder for his perineal rash    We will see him seen back in 1 month for follow-up.    Agreeable to plan of care.     Patient's Body mass index is 33.9 kg/m². BMI is above normal parameters. Recommendations include: educational material, exercise counseling and nutrition counseling.    Smoking Cessation Counseling:  Never a smoker.  Patient does not currently use any tobacco products.     Counseling was given to patient for the following topics diagnostic results including: Testicular pain, BPH, instructions for management as follows: Continue Flomax daily as prescribed, increase p.o. fluid intake 1 to 2 L daily Diflucan 100 mg as needed nystatin to perineal rash, and risk factor reductions including: Bladder irritants such as caffeine products, masturbation. The interim medical history and current results were reviewed.  A treatment plan with follow-up was made for Persistent testicular pain [N50.9].   .           This document has been electronically signed by Griselda Cheng-Akwa, APRN June 11, 2020 21:56

## 2020-07-09 ENCOUNTER — OFFICE VISIT (OUTPATIENT)
Dept: UROLOGY | Facility: CLINIC | Age: 55
End: 2020-07-09

## 2020-07-09 VITALS — TEMPERATURE: 98.3 F | HEIGHT: 74 IN | WEIGHT: 259 LBS | BODY MASS INDEX: 33.24 KG/M2

## 2020-07-09 DIAGNOSIS — N52.9 ERECTILE DYSFUNCTION, UNSPECIFIED ERECTILE DYSFUNCTION TYPE: Primary | ICD-10-CM

## 2020-07-09 DIAGNOSIS — R35.0 URINE FREQUENCY: ICD-10-CM

## 2020-07-09 PROCEDURE — 99214 OFFICE O/P EST MOD 30 MIN: CPT | Performed by: NURSE PRACTITIONER

## 2020-07-09 RX ORDER — OXYBUTYNIN CHLORIDE 5 MG/1
5 TABLET ORAL DAILY
Qty: 30 TABLET | Refills: 3 | Status: SHIPPED | OUTPATIENT
Start: 2020-07-09 | End: 2020-08-08

## 2020-07-09 RX ORDER — SILDENAFIL CITRATE 20 MG/1
20 TABLET ORAL DAILY
Qty: 24 TABLET | Refills: 6 | Status: SHIPPED | OUTPATIENT
Start: 2020-07-09 | End: 2020-08-26

## 2020-07-09 NOTE — PROGRESS NOTES
"Chief Complaint:          Chief Complaint   Patient presents with   • Erectile Dysfunction     1 Month follow Up Testicle Pain       HPI:   55 y.o. male.  Patient presents for his One month follow up Testicle Pain. He reports he feels better and his discomfort has resolved. He still has \"prostate Spasms\"  And bladder spasms and is now taking muscle relaxer's from his primary care which has been very helpful. Overall, He states he is doing relatively better, and he reports his groin pain is now  Negligible dull ache which is inconsistent.     He reports an occasional  testicular discomfort and he feels a lot of friction when he walks or during activity.  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  Overall his testicular pain is better as he does not masturbate often.      He now has new concerns of Erectile Dysfunction and would like some Viagra. He has urinary frequency, urgency, and reports urinary leakage at times. He has Nocturia 4-5 x. He denies having any more dysuria or burning on urination.  He denies gross hematuria, chills or fevers, he denies back pain, abdominal pain.  His urine dipstick today is completely negative for any infection. His IPSS score is 14.       Past Medical History:        Past Medical History:   Diagnosis Date   • Hypertension          Current Meds:     Current Outpatient Medications   Medication Sig Dispense Refill   • cyclobenzaprine (FLEXERIL) 10 MG tablet 10 mg.     • diclofenac (VOLTAREN) 1 % gel gel Apply 4 g topically to the appropriate area as directed 4 (Four) Times a Day As Needed.     • ibuprofen (ADVIL,MOTRIN) 800 MG tablet 800 mg.     • losartan-hydrochlorothiazide (HYZAAR) 50-12.5 MG per tablet Take 1 tablet by mouth Daily. FOR 30 DAYS     • methocarbamol (ROBAXIN) 750 MG tablet 750 mg.     • methylPREDNISolone (MEDROL, FLORINA,) 4 MG tablet 4 mg.     • " metroNIDAZOLE (METROCREAM) 0.75 % cream Apply  topically to the appropriate area as directed 2 (Two) Times a Day.     • montelukast (SINGULAIR) 10 MG tablet Take 10 mg by mouth Daily. FOR 30 DAYS     • mupirocin (BACTROBAN) 2 % ointment Apply  topically to the appropriate area as directed 3 (Three) Times a Day.     • polyethylene glycol (MIRALAX) 17 GM/SCOOP powder Take 17 g by mouth As Needed for Constipation.     • oxybutynin (DITROPAN) 5 MG tablet Take 1 tablet by mouth Daily for 30 days. 30 tablet 3   • sildenafil (REVATIO) 20 MG tablet Take 1 tablet by mouth Daily for 48 doses. May take up to 5 by mouth one hour prior to intercourse on an empty stomach 24 tablet 6     No current facility-administered medications for this visit.         Allergies:      Allergies   Allergen Reactions   • Penicillins Hives        Past Surgical History:     Past Surgical History:   Procedure Laterality Date   • COLONOSCOPY  2015    Dr. jack- colon polyps, did not have sedation due to fear         Social History:     Social History     Socioeconomic History   • Marital status:      Spouse name: Not on file   • Number of children: Not on file   • Years of education: Not on file   • Highest education level: Not on file   Tobacco Use   • Smoking status: Never Smoker   • Smokeless tobacco: Never Used   Substance and Sexual Activity   • Alcohol use: Never     Frequency: Never   • Drug use: Never   • Sexual activity: Defer       Family History:     Family History   Problem Relation Age of Onset   • Heart disease Father    • Hypertension Father    • Heart disease Mother    • Hypertension Mother    • Cancer Mother        Review of Systems:     Review of Systems   Constitutional: Negative for chills and fever.   HENT: Negative for sinus pressure.    Respiratory: Negative for cough.    Cardiovascular: Negative for leg swelling.   Gastrointestinal: Positive for abdominal pain and rectal pain.   Genitourinary: Positive for frequency,  scrotal swelling and testicular pain. Negative for dysuria and urgency.   Musculoskeletal: Negative for back pain.   Neurological: Negative for headaches.   Psychiatric/Behavioral: The patient is not nervous/anxious.      IPSS Questionnaire (AUA-7):  Over the past month…    1)  How often have you had a sensation of not emptying your bladder completely after you finish urinating?  2 - Less than half the time   2)  How often have you had to urinate again less than two hours after you finished urinating? 2 - Less than half the time   3)  How often have you found you stopped and started again several times when you urinated?  2 - Less than half the time   4) How difficult have you found it to postpone urination?  2 - Less than half the time   5) How often have you had a weak urinary stream?  1 - Less than 1 time in 5   6) How often have you had to push or strain to begin urination?  0 - Not at all   7) How many times did you most typically get up to urinate from the time you went to bed until the time you got up in the morning?  5 - 5+ times   Total score:  0-7 mildly symptomatic    8-19 moderately symptomatic ------------------------------ 14    20-35 severely symptomatic     Physical Exam:     Physical Exam   Constitutional: He is oriented to person, place, and time. He appears well-developed and well-nourished. No distress.   HENT:   Head: Normocephalic and atraumatic.   Right Ear: External ear normal.   Left Ear: External ear normal.   Eyes: Pupils are equal, round, and reactive to light. Conjunctivae and EOM are normal. Right eye exhibits no discharge. Left eye exhibits no discharge.   Neck: Normal range of motion. Neck supple. No tracheal deviation present. No thyromegaly present.   Cardiovascular: Normal rate and regular rhythm. Exam reveals no friction rub.   No murmur heard.  Pulmonary/Chest: Effort normal and breath sounds normal. No stridor. No respiratory distress.   Abdominal: Soft. Bowel sounds are normal.  He exhibits distension. There is tenderness. There is no guarding.   Genitourinary: Rectum normal, testes normal and penis normal. Rectal exam shows guaiac negative stool. Uncircumcised. No penile tenderness. No discharge found.   Musculoskeletal: Normal range of motion. He exhibits tenderness. He exhibits no edema or deformity.   Neurological: He is alert and oriented to person, place, and time. No cranial nerve deficit. Coordination normal.   Skin: Skin is warm and dry. Capillary refill takes less than 2 seconds. No pallor.   Psychiatric: He has a normal mood and affect. His behavior is normal. Judgment and thought content normal.       Procedure:       Assessment/Plan:   Groin Pain /Erectile Dysfunction/frequency/urine frequency: Patient presents to clinic for follow-up today.  He states his testicular discomfort is very manageable.  He reports erectile dysfunction and has become bothersome to him.  We discussed the anatomy and physiology of the penis and the endothelium.  We discussed the various forms of erectile dysfunction including peripheral vascular occlusive disease, Postoperative, secondary to radiation treatments of the prostate, and arterial inflow.      We discussed the various treatment options available including oral medication and its various forms.  We discussed the use of both generic and non-generic Viagra.  We discussed Cialis and a longer half-life of 17 hours as well as the other 2 medications. We discussed cost involved with this including the fact that the generic is much cheaper but if taken has multiple pills because they are 20 mg dosages.  We did discuss the other alternatives including Penile injections, vacuum erection devices and surgical intervention reserved for only the most severe cases.  We discussed the need for testosterone in about 20% of cases of erectile dysfunction.      The patient desires Viagra to treat his erectile dysfunction. History and physical exam has not  disclosed any obvious treatable cause of this complaint. He is informed that Viagra is sometimes not covered by insurance. It is available on a fee-for-service cost basis, and is relatively expensive. He can start with 50 mg dose, and increase to 100 mg if necessary. The method of use 1 hour prior to anticipated intercourse is explained. He should not use any more than one tablet in a 24 hour period. The side effects of possible headache, flushing, dyspepsia and transient changes in vision have been explained.     The patient is not taking nitrates, and denies he has access to nitrates in any form at any time. I have counseled him that taking Viagra with nitrates of any form can cause death. Additionally, Viagra serum concentrations can be increased by the following: cimetidine, erythromycin, itraconazole or ketoconazole. This patient does not take these drugs, but I have counseled him to avoid Viagra if he does take any of these.    We have also discussed the fact that there have been some deaths in patients after taking Viagra, felt due to the exertion of intercourse rather than the drug itself. The patient is aware of this, and accepts whatever unknown degree of risk there is in this aspect.    Will start Sildenafil 20 mg daily(maximum 100 mg) in 24 hours one hour prior to intercourse on an empty stomach.    Oxybutunin 5 mg daily for Urinary frequency and incontinence,    Patient has been scheduled for Cystoscopy on 09/03/20 with Dr. Ward.    Patient reports that he is currently experiencing symptoms of urinary incontinence.    Patient's Body mass index is 33.25 kg/m². BMI is above normal parameters. Recommendations include: educational material, exercise counseling and nutrition counseling.              This document has been electronically signed by Griselda Cheng-Akwa, ARNP July 9, 2020 14:02

## 2020-07-10 NOTE — PATIENT INSTRUCTIONS
Sildenafil injection  What is this medicine?  SILDENAFIL (yane DEN a vinita) is used to treat pulmonary arterial hypertension. This is a serious heart and lung condition. This medicine helps to improve symptoms and quality of life.  This medicine may be used for other purposes; ask your health care provider or pharmacist if you have questions.  COMMON BRAND NAME(S): Revatio  What should I tell my health care provider before I take this medicine?  They need to know if you have any of these conditions:  · anatomical deformation of the penis, Peyronie's disease, or history of priapism (painful and prolonged erection)  · bleeding disorders  · eye disease, vision problems  · heart disease  · high or low blood pressure  · history of blood diseases, like sickle cell anemia or leukemia  · kidney disease  · liver disease  · pulmonary veno-occlusive disease (PVOD)  · stomach ulcer  · an unusual or allergic reaction to sildenafil, other medicines, foods, dyes, or preservatives  · pregnant or trying to get pregnant  · breast-feeding  How should I use this medicine?  This medicine is for injection into a vein. It is given by a health care professional in a hospital or clinic setting.  Talk to your pediatrician regarding the use of this medicine in children. This medicine is not approved for use in children.  Overdosage: If you think you have taken too much of this medicine contact a poison control center or emergency room at once.  NOTE: This medicine is only for you. Do not share this medicine with others.  What if I miss a dose?  It is important not to miss your dose. Call your doctor or health care professional if you are unable to keep an appointment.  What may interact with this medicine?  Do not take this medicine with any of the following medications:  · cisapride  · cobicistat  · nitrates like amyl nitrite, isosorbide dinitrate, isosorbide mononitrate, nitroglycerin  · riociguat  · telaprevir  This medicine may also interact  with the following medications:  · antiviral medicines for HIV or AIDS  · bosentan  · certain medicines for benign prostatic hyperplasia (BPH)  · certain medicines for blood pressure  · certain medicines for fungal infections like ketoconazole and itraconazole  · cimetidine  · erythromycin  · rifampin  This list may not describe all possible interactions. Give your health care provider a list of all the medicines, herbs, non-prescription drugs, or dietary supplements you use. Also tell them if you smoke, drink alcohol, or use illegal drugs. Some items may interact with your medicine.  What should I watch for while using this medicine?  Tell your doctor or healthcare professional if your symptoms do not start to get better or if they get worse.  Tell your doctor or health care professional right away if you have any change in your eyesight or hearing.  You may get dizzy. Do not drive, use machinery, or do anything that needs mental alertness until you know how this medicine affects you. Do not stand or sit up quickly, especially if you are an older patient. This reduces the risk of dizzy or fainting spells. Avoid alcoholic drinks; they can make you more dizzy.  What side effects may I notice from receiving this medicine?  Side effects that you should report to your doctor or health care professional as soon as possible:  · allergic reactions like skin rash, itching or hives, swelling of the face, lips, or tongue  · breathing problems  · changes in vision  · chest pain  · decreased hearing  · fast, irregular heartbeat  · men: prolonged or painful erection (lasting more than 4 hours)  Side effects that usually do not require medical attention (report to your doctor or health care professional if they continue or are bothersome):  · facial flushing  · headache  · nosebleed  · trouble sleeping  · upset stomach  This list may not describe all possible side effects. Call your doctor for medical advice about side effects.  You may report side effects to FDA at 6-875-FDA-6211.  Where should I keep my medicine?  This drug is given in a hospital or clinic and will not be stored at home.  NOTE: This sheet is a summary. It may not cover all possible information. If you have questions about this medicine, talk to your doctor, pharmacist, or health care provider.  © 2020 [employer]vier/Gold Standard (2017-01-19 09:15:46)  Erectile Dysfunction  Erectile dysfunction (ED) is the inability to get or keep an erection in order to have sexual intercourse. Erectile dysfunction may include:  · Inability to get an erection.  · Lack of enough hardness of the erection to allow penetration.  · Loss of the erection before sex is finished.  What are the causes?  This condition may be caused by:  · Certain medicines, such as:  ? Pain relievers.  ? Antihistamines.  ? Antidepressants.  ? Blood pressure medicines.  ? Water pills (diuretics).  ? Ulcer medicines.  ? Muscle relaxants.  ? Drugs.  · Excessive drinking.  · Psychological causes, such as:  ? Anxiety.  ? Depression.  ? Sadness.  ? Exhaustion.  ? Performance fear.  ? Stress.  · Physical causes, such as:  ? Artery problems. This may include diabetes, smoking, liver disease, or atherosclerosis.  ? High blood pressure.  ? Hormonal problems, such as low testosterone.  ? Obesity.  ? Nerve problems. This may include back or pelvic injuries, diabetes mellitus, multiple sclerosis, or Parkinson disease.  What are the signs or symptoms?  Symptoms of this condition include:  · Inability to get an erection.  · Lack of enough hardness of the erection to allow penetration.  · Loss of the erection before sex is finished.  · Normal erections at some times, but with frequent unsatisfactory episodes.  · Low sexual satisfaction in either partner due to erection problems.  · A curved penis occurring with erection. The curve may cause pain or the penis may be too curved to allow for intercourse.  · Never having nighttime  erections.  How is this diagnosed?  This condition is often diagnosed by:  · Performing a physical exam to find other diseases or specific problems with the penis.  · Asking you detailed questions about the problem.  · Performing blood tests to check for diabetes mellitus or to measure hormone levels.  · Performing other tests to check for underlying health conditions.  · Performing an ultrasound exam to check for scarring.  · Performing a test to check blood flow to the penis.  · Doing a sleep study at home to measure nighttime erections.  How is this treated?  This condition may be treated by:  · Medicine taken by mouth to help you achieve an erection (oral medicine).  · Hormone replacement therapy to replace low testosterone levels.  · Medicine that is injected into the penis. Your health care provider may instruct you how to give yourself these injections at home.  · Vacuum pump. This is a pump with a ring on it. The pump and ring are placed on the penis and used to create pressure that helps the penis become erect.  · Penile implant surgery. In this procedure, you may receive:  ? An inflatable implant. This consists of cylinders, a pump, and a reservoir. The cylinders can be inflated with a fluid that helps to create an erection, and they can be deflated after intercourse.  ? A semi-rigid implant. This consists of two silicone rubber rods. The rods provide some rigidity. They are also flexible, so the penis can both curve downward in its normal position and become straight for sexual intercourse.  · Blood vessel surgery, to improve blood flow to the penis. During this procedure, a blood vessel from a different part of the body is placed into the penis to allow blood to flow around (bypass) damaged or blocked blood vessels.  · Lifestyle changes, such as exercising more, losing weight, and quitting smoking.  Follow these instructions at home:  Medicines    · Take over-the-counter and prescription medicines only  as told by your health care provider. Do not increase the dosage without first discussing it with your health care provider.  · If you are using self-injections, perform injections as directed by your health care provider. Make sure to avoid any veins that are on the surface of the penis. After giving an injection, apply pressure to the injection site for 5 minutes.  General instructions  · Exercise regularly, as directed by your health care provider. Work with your health care provider to lose weight, if needed.  · Do not use any products that contain nicotine or tobacco, such as cigarettes and e-cigarettes. If you need help quitting, ask your health care provider.  · Before using a vacuum pump, read the instructions that come with the pump and discuss any questions with your health care provider.  · Keep all follow-up visits as told by your health care provider. This is important.  Contact a health care provider if:  · You feel nauseous.  · You vomit.  Get help right away if:  · You are taking oral or injectable medicines and you have an erection that lasts longer than 4 hours. If your health care provider is unavailable, go to the nearest emergency room for evaluation. An erection that lasts much longer than 4 hours can result in permanent damage to your penis.  · You have severe pain in your groin or abdomen.  · You develop redness or severe swelling of your penis.  · You have redness spreading up into your groin or lower abdomen.  · You are unable to urinate.  · You experience chest pain or a rapid heart beat (palpitations) after taking oral medicines.  Summary  · Erectile dysfunction (ED) is the inability to get or keep an erection during sexual intercourse. This problem can usually be treated successfully.  · This condition is diagnosed based on a physical exam, your symptoms, and tests to determine the cause. Treatment varies depending on the cause, and may include medicines, hormone therapy, surgery, or  vacuum pump.  · You may need follow-up visits to make sure that you are using your medicines or devices correctly.  · Get help right away if you are taking or injecting medicines and you have an erection that lasts longer than 4 hours.  This information is not intended to replace advice given to you by your health care provider. Make sure you discuss any questions you have with your health care provider.  Document Released: 12/15/2001 Document Revised: 11/30/2018 Document Reviewed: 01/03/2018  Elsevier Patient Education © 2020 [employer]

## 2020-09-24 ENCOUNTER — TRANSCRIBE ORDERS (OUTPATIENT)
Dept: ADMINISTRATIVE | Facility: HOSPITAL | Age: 55
End: 2020-09-24

## 2020-09-24 DIAGNOSIS — Z01.818 OTHER SPECIFIED PRE-OPERATIVE EXAMINATION: Primary | ICD-10-CM

## 2020-09-28 ENCOUNTER — LAB (OUTPATIENT)
Dept: LAB | Facility: HOSPITAL | Age: 55
End: 2020-09-28

## 2020-10-22 ENCOUNTER — PROCEDURE VISIT (OUTPATIENT)
Dept: UROLOGY | Facility: CLINIC | Age: 55
End: 2020-10-22

## 2020-10-22 VITALS — HEIGHT: 74 IN | BODY MASS INDEX: 33.24 KG/M2 | WEIGHT: 259 LBS | TEMPERATURE: 98.9 F

## 2020-10-22 DIAGNOSIS — N41.1 CHRONIC PROSTATITIS: ICD-10-CM

## 2020-10-22 DIAGNOSIS — Z79.2 PROPHYLACTIC ANTIBIOTIC: ICD-10-CM

## 2020-10-22 DIAGNOSIS — R35.0 FREQUENCY OF URINATION: Primary | ICD-10-CM

## 2020-10-22 PROCEDURE — 96372 THER/PROPH/DIAG INJ SC/IM: CPT | Performed by: UROLOGY

## 2020-10-22 PROCEDURE — 99213 OFFICE O/P EST LOW 20 MIN: CPT | Performed by: UROLOGY

## 2020-10-22 PROCEDURE — 52000 CYSTOURETHROSCOPY: CPT | Performed by: UROLOGY

## 2020-10-22 RX ORDER — TAMSULOSIN HYDROCHLORIDE 0.4 MG/1
1 CAPSULE ORAL NIGHTLY
Qty: 30 CAPSULE | Refills: 5 | Status: SHIPPED | OUTPATIENT
Start: 2020-10-22 | End: 2021-03-10

## 2020-10-22 RX ORDER — SULFAMETHOXAZOLE AND TRIMETHOPRIM 800; 160 MG/1; MG/1
1 TABLET ORAL 2 TIMES DAILY
Qty: 84 TABLET | Refills: 2 | Status: SHIPPED | OUTPATIENT
Start: 2020-10-22 | End: 2020-12-28

## 2020-10-22 RX ORDER — GENTAMICIN SULFATE 40 MG/ML
80 INJECTION, SOLUTION INTRAMUSCULAR; INTRAVENOUS ONCE
Status: COMPLETED | OUTPATIENT
Start: 2020-10-22 | End: 2020-10-22

## 2020-10-22 RX ADMIN — GENTAMICIN SULFATE 80 MG: 40 INJECTION, SOLUTION INTRAMUSCULAR; INTRAVENOUS at 10:34

## 2020-10-22 NOTE — PROGRESS NOTES
Chief Complaint:          Chief Complaint   Patient presents with   • cystoscopy       HPI:   55 y.o. male.  Who has recurrent, recalcitrant, chronic prostatitis that is been doing extensive prostate massage unsuccessfully.  He now presents for lower tract investigation.  He has significant urinary frequency.    Past Medical History:        Past Medical History:   Diagnosis Date   • Hypertension          Current Meds:     Current Outpatient Medications   Medication Sig Dispense Refill   • cyclobenzaprine (FLEXERIL) 10 MG tablet 10 mg.     • diclofenac (VOLTAREN) 1 % gel gel Apply 4 g topically to the appropriate area as directed 4 (Four) Times a Day As Needed.     • ibuprofen (ADVIL,MOTRIN) 800 MG tablet 800 mg.     • losartan-hydrochlorothiazide (HYZAAR) 50-12.5 MG per tablet Take 1 tablet by mouth Daily. FOR 30 DAYS     • methocarbamol (ROBAXIN) 750 MG tablet 750 mg.     • methylPREDNISolone (MEDROL, FLORINA,) 4 MG tablet 4 mg.     • metroNIDAZOLE (METROCREAM) 0.75 % cream Apply  topically to the appropriate area as directed 2 (Two) Times a Day.     • montelukast (SINGULAIR) 10 MG tablet Take 10 mg by mouth Daily. FOR 30 DAYS     • mupirocin (BACTROBAN) 2 % ointment Apply  topically to the appropriate area as directed 3 (Three) Times a Day.     • polyethylene glycol (MIRALAX) 17 GM/SCOOP powder Take 17 g by mouth As Needed for Constipation.       No current facility-administered medications for this visit.         Allergies:      Allergies   Allergen Reactions   • Penicillins Hives        Past Surgical History:     Past Surgical History:   Procedure Laterality Date   • COLONOSCOPY  2015    Dr. jack- colon polyps, did not have sedation due to fear         Social History:     Social History     Socioeconomic History   • Marital status:      Spouse name: Not on file   • Number of children: Not on file   • Years of education: Not on file   • Highest education level: Not on file   Tobacco Use   • Smoking status:  Never Smoker   • Smokeless tobacco: Never Used   Substance and Sexual Activity   • Alcohol use: Never     Frequency: Never   • Drug use: Never   • Sexual activity: Defer       Family History:     Family History   Problem Relation Age of Onset   • Heart disease Father    • Hypertension Father    • Heart disease Mother    • Hypertension Mother    • Cancer Mother        Review of Systems:     Review of Systems   Constitutional: Negative.    HENT: Negative.    Eyes: Negative.    Respiratory: Negative.    Cardiovascular: Negative.    Gastrointestinal: Negative.    Endocrine: Negative.    Genitourinary: Positive for difficulty urinating, dysuria, frequency and urgency.   Musculoskeletal: Negative.    Allergic/Immunologic: Negative.    Neurological: Negative.    Hematological: Negative.    Psychiatric/Behavioral: Negative.        Physical Exam:     Physical Exam  Vitals signs and nursing note reviewed.   Constitutional:       Appearance: He is well-developed.   HENT:      Head: Normocephalic and atraumatic.   Eyes:      Conjunctiva/sclera: Conjunctivae normal.      Pupils: Pupils are equal, round, and reactive to light.   Neck:      Musculoskeletal: Normal range of motion.   Cardiovascular:      Rate and Rhythm: Normal rate and regular rhythm.      Heart sounds: Normal heart sounds.   Pulmonary:      Effort: Pulmonary effort is normal.      Breath sounds: Normal breath sounds.   Abdominal:      General: Bowel sounds are normal.      Palpations: Abdomen is soft.   Genitourinary:     Penis: Normal.       Scrotum/Testes: Normal.   Musculoskeletal: Normal range of motion.   Skin:     General: Skin is warm and dry.   Neurological:      Mental Status: He is alert and oriented to person, place, and time.      Deep Tendon Reflexes: Reflexes are normal and symmetric.   Psychiatric:         Behavior: Behavior normal.         Thought Content: Thought content normal.         Judgment: Judgment normal.         I have reviewed the  following portions of the patient's history: allergies, current medications, past family history, past medical history, past social history, past surgical history, problem list and ROS and confirm it's accurate.      Procedure:   Cystoscopy:  Patient presents today for cystourethroscopy.  I went ahead and obtained an informed consent including the risk of anesthesia, bleeding, infection, etc.  After prep and drape in a sterile fashion in the low dorsal lithotomy position the urethra was gently anesthetized with 10 cc of 2% viscous Xylocaine jelly.  After an appropriate period of topical anesthesia I used the Olympus digital 14 [place] flexible cystoscope to examine the anterior urethra which was completely normal, the ureteral orifices were visualized and normal in position and configuration there were no stones, tumors or foreign bodies.  The urethra was normal, there was extensive polypoid prostatitis in the fossa there was no visual obstruction the patient was given 80 mg of gentamicin in an intramuscular fashion  as prophylaxis for the cystoscopy and released from the clinic.    Assessment/Plan:   Prostatitis-we discussed the differential diagnosis of prostatitis including the National Institutes of Health classification system I through IV.  I discussed that type I prostatitis is  acute bacterial prostatitis and an associated with high fevers typically hematuria and severe pain with voiding.  We discussed the fact that it necessitates 6 weeks of chronic antibiotics to be sure it doesn't turn into a chronic bacterial prostatitis that can have lifelong ramifications.  We discussed National Institutes of Health type II chronic bacterial prostatitis.  We discussed the fact that this a chronic bacterial infection of the prostate that often requires suppressive antibiotics.  We discussed type III being related more to nonspecific urethritis as well as tight for being related to finding inflammation and a biopsy in the  absence of symptomatology.  I discussed the diagnostic strategies including the VB 1 through 4 urine culture and discussed empiric therapy.  I emphasized that with the use of chronic antibiotics I strongly recommended the concomitant use of probiotics.  Additionally, we discussed the various antibiotics used and the risks and benefits associated with each particularly the use of long-term ciprofloxacin and the effect on joints and collagen in human beings.  He has obvious prostatitis I placed him on 6 weeks of antibiotics I will see him back based on this I also recommended an alpha-blocker to facilitate voiding            Patient's Body mass index is 33.24 kg/m². BMI is above normal parameters. Recommendations include: educational material.              This document has been electronically signed by WILDA ESPINO MD October 22, 2020 09:17 EDT

## 2020-10-23 PROBLEM — N41.1 CHRONIC PROSTATITIS: Status: ACTIVE | Noted: 2020-10-23

## 2020-11-12 ENCOUNTER — OFFICE VISIT (OUTPATIENT)
Dept: UROLOGY | Facility: CLINIC | Age: 55
End: 2020-11-12

## 2020-11-12 VITALS — TEMPERATURE: 97.9 F | WEIGHT: 255.6 LBS | BODY MASS INDEX: 32.8 KG/M2 | HEIGHT: 74 IN

## 2020-11-12 DIAGNOSIS — N41.1 CHRONIC PROSTATITIS: Primary | ICD-10-CM

## 2020-11-12 DIAGNOSIS — R35.0 URINE FREQUENCY: ICD-10-CM

## 2020-11-12 DIAGNOSIS — N40.1 BPH WITH OBSTRUCTION/LOWER URINARY TRACT SYMPTOMS: ICD-10-CM

## 2020-11-12 DIAGNOSIS — N13.8 BPH WITH OBSTRUCTION/LOWER URINARY TRACT SYMPTOMS: ICD-10-CM

## 2020-11-12 LAB
BILIRUB BLD-MCNC: NEGATIVE MG/DL
CLARITY, POC: CLEAR
COLOR UR: YELLOW
GLUCOSE UR STRIP-MCNC: NEGATIVE MG/DL
KETONES UR QL: NEGATIVE
LEUKOCYTE EST, POC: NEGATIVE
NITRITE UR-MCNC: NEGATIVE MG/ML
PH UR: 6.5 [PH] (ref 5–8)
PROT UR STRIP-MCNC: NEGATIVE MG/DL
RBC # UR STRIP: NEGATIVE /UL
SP GR UR: 1.01 (ref 1–1.03)
UROBILINOGEN UR QL: NORMAL

## 2020-11-12 PROCEDURE — 99214 OFFICE O/P EST MOD 30 MIN: CPT | Performed by: NURSE PRACTITIONER

## 2020-11-12 NOTE — PROGRESS NOTES
Chief Complaint:          Chief Complaint   Patient presents with   • Chronic Prostatitis /BPH with Urine Frequency      3 Week Follow UP       HPI:   55 y.o. male.  With a significant history of BPH with urine frequency, recurrent chronic prostatitis which has been recalcitrant to multiple antibiotic therapy, and extensive unsuccessful prostate massage returns to clinic today for follow-up, earlier than his scheduled appointment in 6 weeks.    Patient had a cystoscopy 3 weeks ago by Dr. 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  He was placed on antibiotic Bactrim for 6 weeks.  He reports minimal improvement in his symptoms, he reports he would like his prostate checked again today to see if the swelling is improved, and he would also like a prostate massage.    Explained to patient it takes takes a while for chronic prostatitis to be resolved as this may take up to 6 months to a year given his multiple use of different antibiotic therapy.  His urine dipstick today is completely negative for any infection, his PVR is 0.  His IPSS score is 9.      Past Medical History:        Past Medical History:   Diagnosis Date   • Chronic prostatitis 10/23/2020   • Hypertension        The following portions of the patient's history were reviewed and updated as appropriate: allergies, current medications, past family history, past medical history, past social history, past surgical history and problem list.    Current Meds:     Current Outpatient Medications   Medication Sig Dispense Refill   • cyclobenzaprine (FLEXERIL) 10 MG tablet 10 mg.     • diclofenac (VOLTAREN) 1 % gel gel Apply 4 g topically to the appropriate area as directed 4 (Four) Times a Day As Needed.     • losartan-hydrochlorothiazide (HYZAAR) 50-12.5 MG per tablet Take 1 tablet by mouth Daily. FOR 30 DAYS     • polyethylene glycol (MIRALAX) 17 GM/SCOOP powder Take 17 g by mouth As Needed for Constipation.      • sulfamethoxazole-trimethoprim (Bactrim DS) 800-160 MG per tablet Take 1 tablet by mouth 2 (Two) Times a Day. 84 tablet 2   • tamsulosin (Flomax) 0.4 MG capsule 24 hr capsule Take 1 capsule by mouth Every Night. 30 capsule 5   • ibuprofen (ADVIL,MOTRIN) 800 MG tablet 800 mg.     • methocarbamol (ROBAXIN) 750 MG tablet 750 mg.     • methylPREDNISolone (MEDROL, FLORINA,) 4 MG tablet 4 mg.     • metroNIDAZOLE (METROCREAM) 0.75 % cream Apply  topically to the appropriate area as directed 2 (Two) Times a Day.     • montelukast (SINGULAIR) 10 MG tablet Take 10 mg by mouth Daily. FOR 30 DAYS     • mupirocin (BACTROBAN) 2 % ointment Apply  topically to the appropriate area as directed 3 (Three) Times a Day.       No current facility-administered medications for this visit.         Allergies:      Allergies   Allergen Reactions   • Penicillins Hives        Past Surgical History:     Past Surgical History:   Procedure Laterality Date   • COLONOSCOPY  2015    Dr. jack- colon polyps, did not have sedation due to fear         Social History:     Social History     Socioeconomic History   • Marital status:      Spouse name: Not on file   • Number of children: Not on file   • Years of education: Not on file   • Highest education level: Not on file   Tobacco Use   • Smoking status: Never Smoker   • Smokeless tobacco: Never Used   Substance and Sexual Activity   • Alcohol use: Never     Frequency: Never   • Drug use: Never   • Sexual activity: Defer       Family History:     Family History   Problem Relation Age of Onset   • Heart disease Father    • Hypertension Father    • Heart disease Mother    • Hypertension Mother    • Cancer Mother        Review of Systems:    Review of Systems   Constitutional: Negative for activity change, appetite change, chills, fatigue and fever.   HENT: Positive for postnasal drip, rhinorrhea and sinus pressure. Negative for congestion.    Eyes: Negative for blurred vision and double vision.      Respiratory: Negative for shortness of breath and wheezing.    Gastrointestinal: Positive for abdominal distention, constipation and rectal pain. Negative for abdominal pain, diarrhea, nausea and vomiting.   Genitourinary: Positive for frequency, nocturia, erectile dysfunction, urgency and urinary incontinence. Negative for difficulty urinating, discharge, dysuria, flank pain, genital sores, hematuria, penile pain, penile swelling, scrotal swelling and testicular pain.   Musculoskeletal: Negative for back pain.   Neurological: Negative for dizziness, weakness and confusion.   Psychiatric/Behavioral: Positive for stress. Negative for agitation, behavioral problems and decreased concentration.      IPSS Questionnaire (AUA-7):  Over the past month…    1)  How often have you had a sensation of not emptying your bladder completely after you finish urinating?  1 - Less than 1 time in 5   2)  How often have you had to urinate again less than two hours after you finished urinating? 3 - About half the time   3)  How often have you found you stopped and started again several times when you urinated?  0 - Not at all   4) How difficult have you found it to postpone urination?  1 - Less than 1 time in 5   5) How often have you had a weak urinary stream?  0 - Not at all   6) How often have you had to push or strain to begin urination?  1 - Less than 1 time in 5   7) How many times did you most typically get up to urinate from the time you went to bed until the time you got up in the morning?  4 - 4 times   Total score:  0-7 mildly symptomatic    8-19 moderately symptomatic                                          9    20-35 severely symptomatic       Physical Exam:     Physical Exam  Constitutional:       General: He is not in acute distress.     Appearance: He is well-developed.   HENT:      Head: Normocephalic and atraumatic.      Right Ear: External ear normal.      Left Ear: External ear normal.   Eyes:      General:          Right eye: No discharge.         Left eye: No discharge.      Conjunctiva/sclera: Conjunctivae normal.      Pupils: Pupils are equal, round, and reactive to light.   Neck:      Musculoskeletal: Normal range of motion and neck supple.      Thyroid: No thyromegaly.      Trachea: No tracheal deviation.   Cardiovascular:      Rate and Rhythm: Normal rate and regular rhythm.      Heart sounds: No murmur. No friction rub.   Pulmonary:      Effort: Pulmonary effort is normal. No respiratory distress.      Breath sounds: Normal breath sounds. No stridor.   Abdominal:      General: Bowel sounds are normal. There is no distension.      Palpations: Abdomen is soft.      Tenderness: There is no abdominal tenderness. There is no guarding.   Genitourinary:     Penis: Normal and uncircumcised. No tenderness or discharge.       Scrotum/Testes: Normal.      Rectum: Normal. Guaiac result negative.   Musculoskeletal: Normal range of motion.         General: Tenderness present. No deformity.   Skin:     General: Skin is warm and dry.      Capillary Refill: Capillary refill takes less than 2 seconds.      Coloration: Skin is not pale.   Neurological:      Mental Status: He is alert and oriented to person, place, and time.      Cranial Nerves: No cranial nerve deficit.      Coordination: Coordination normal.   Psychiatric:         Behavior: Behavior normal.         Thought Content: Thought content normal.         Judgment: Judgment normal.         Procedure:     No notes on file      Assessment:     Encounter Diagnoses   Name Primary?   • Chronic prostatitis Yes   • BPH with obstruction/lower urinary tract symptoms    • Urine frequency      Orders Placed This Encounter   Procedures   • Bladder Scan   • POC Urinalysis Dipstick, Automated       Plan:        ASSESSMENT  BPH with Urine Frequency /Chronic Prostatitis : He returns to clinic today for follow-up earlier than his scheduled appointment date.  He reports he will want his prostate  rechecked after cystoscopy to see if his prostate enlargement is improved.  Explained to patient antibiotics take at least 6 weeks sometimes 6 months for chronic prostatitis to resolve.     Again, we discussed the pathophysiology of BPH and obstruction.  We discussed the static and dynamic effect effects of BPH as well as using 5 alpha reductase inhibitors versus alpha blockade.  We discussed the indications for transurethral surgery as well.  And/ or other therapeutic options available including all of the newer techniques.  He has no real symptomatology referable to his prostate other than very mild enlargement.  Also there was no visual obstruction as examined via cystoscopy.    Again, We discussed the differential diagnosis of prostatitis including the National Institutes of Health classification system I through IV.  I discussed that type I prostatitis as  acute bacterial prostatitis and an associated with high fevers typically hematuria and severe pain with voiding. We discussed the fact that it necessitates 6 weeks of chronic antibiotics to be sure it doesn't turn into a chronic bacterial prostatitis that can have lifelong ramifications.  We discussed National Institutes of Health type II chronic bacterial prostatitis.  We discussed the fact that this a chronic bacterial infection of the prostate that often requires suppressive antibiotics.    We discussed type III being related more to nonspecific urethritis as well as tight for being related to finding inflammation and a biopsy in the absence of symptomatology.  I discussed the diagnostic strategies including the VB 1 through 4 urine culture and discussed empiric therapy. Additionally, we discussed the various antibiotics used and the risks and benefits associated with each particularly the use of long-term ciprofloxacin and the effect on joints and collagen in human beings       PLAN  Continue Bactrim DS BID X 6 weeks     I emphasized that with the use of  chronic antibiotics I strongly recommended the concomitant use of probiotics.       Rather than proceed with an expensive workup he doesn't need, I strongly recommend he continue his Flomax 0.4 mg daily as directed.    Discussed some home remedies for acute prostatitis and encouraged patient to engage in taking warm showers or baths as tolerated for comfort, avoiding activities that put pressure on the prostate such as bicycling, sitting on hard surfaces for longer.  Encourage sitting on a donut or  cushion, avoiding bladder irritants such as caffeine, alcohol, reducing or avoiding consumption of spicy foods, and increasing p.o. fluid intake to at least 2 to 3 L of water daily    See him back in 6 weeks for follow-up, to reevaluate his symptoms at that time    Patient is agreeable plan of care    Patient's Body mass index is 32.8 kg/m². BMI is above normal parameters. Recommendations include: educational material, exercise counseling and nutrition counseling.    Smoking Cessation Counseling:  Never a smoker.  Patient does not currently use any tobacco products.     Counseling was given to patient for the following topics diagnostic results including: Chronic prostatitis, BPH and urine frequency, instructions for management as follows: Increase p.o. fluid intake, Bactrim DS twice daily x6 weeks, continue Flomax, prostate massage, and risk factor reductions including: Bladder irritants such as caffeine products, coffee, tea, citrusy/spicy foods.. The interim medical history and current results were reviewed.  A treatment plan with follow-up was made FOR BPH with obstruction/lower urinary tract symptoms, Chronic prostatitis [N41.1].           This document has been electronically signed by Griselda Cheng-Akwa, APRN November 12, 2020 14:21 EST

## 2020-11-12 NOTE — PATIENT INSTRUCTIONS
Prostatitis    Prostatitis is swelling of the prostate gland. The prostate helps to make semen. It is below a man's bladder, in front of the rectum. There are different types of prostatitis.  Follow these instructions at home:    · Take over-the-counter and prescription medicines only as told by your doctor.  · If you were prescribed an antibiotic medicine, take it as told by your doctor. Do not stop taking the antibiotic even if you start to feel better.  · If your doctor prescribed exercises, do them as directed.  · Take sitz baths as told by your doctor. To take a sitz bath, sit in warm water that is deep enough to cover your hips and butt.  · Keep all follow-up visits as told by your doctor. This is important.  Contact a doctor if:  · Your symptoms get worse.  · You have a fever.  Get help right away if:  · You have chills.  · You feel sick to your stomach (nauseous).  · You throw up (vomit).  · You feel light-headed.  · You feel like you might pass out (faint).  · You cannot pee (urinate).  · You have blood or clumps of blood (blood clots) in your pee (urine).  This information is not intended to replace advice given to you by your health care provider. Make sure you discuss any questions you have with your health care provider.  Document Released: 06/18/2013 Document Revised: 11/30/2018 Document Reviewed: 09/07/2017  [employer] Patient Education © 2020 [employer]

## 2020-11-18 ENCOUNTER — TRANSCRIBE ORDERS (OUTPATIENT)
Dept: ADMINISTRATIVE | Facility: HOSPITAL | Age: 55
End: 2020-11-18

## 2020-11-18 DIAGNOSIS — M53.82 MUSCULOSKELETAL DISORDER OF THE SUBOCCIPITAL: Primary | ICD-10-CM

## 2020-11-19 ENCOUNTER — APPOINTMENT (OUTPATIENT)
Dept: MRI IMAGING | Facility: HOSPITAL | Age: 55
End: 2020-11-19

## 2020-12-04 ENCOUNTER — APPOINTMENT (OUTPATIENT)
Dept: MRI IMAGING | Facility: HOSPITAL | Age: 55
End: 2020-12-04

## 2020-12-08 ENCOUNTER — HOSPITAL ENCOUNTER (OUTPATIENT)
Dept: MRI IMAGING | Facility: HOSPITAL | Age: 55
Discharge: HOME | End: 2020-12-08
Admitting: NURSE PRACTITIONER

## 2020-12-08 DIAGNOSIS — M53.82 MUSCULOSKELETAL DISORDER OF THE SUBOCCIPITAL: ICD-10-CM

## 2020-12-08 PROCEDURE — 72141 MRI NECK SPINE W/O DYE: CPT | Performed by: RADIOLOGY

## 2020-12-08 PROCEDURE — 72141 MRI NECK SPINE W/O DYE: CPT

## 2020-12-28 ENCOUNTER — OFFICE VISIT (OUTPATIENT)
Dept: UROLOGY | Facility: CLINIC | Age: 55
End: 2020-12-28

## 2020-12-28 VITALS — BODY MASS INDEX: 32.85 KG/M2 | HEIGHT: 74 IN | TEMPERATURE: 97.6 F | WEIGHT: 256 LBS

## 2020-12-28 DIAGNOSIS — R35.0 URINE FREQUENCY: ICD-10-CM

## 2020-12-28 DIAGNOSIS — N41.1 CHRONIC PROSTATITIS: Primary | ICD-10-CM

## 2020-12-28 DIAGNOSIS — N40.0 BPH WITHOUT OBSTRUCTION/LOWER URINARY TRACT SYMPTOMS: ICD-10-CM

## 2020-12-28 LAB
BILIRUB BLD-MCNC: NEGATIVE MG/DL
CLARITY, POC: CLEAR
COLOR UR: YELLOW
GLUCOSE UR STRIP-MCNC: NEGATIVE MG/DL
KETONES UR QL: NEGATIVE
LEUKOCYTE EST, POC: NEGATIVE
NITRITE UR-MCNC: NEGATIVE MG/ML
PH UR: 6.5 [PH] (ref 5–8)
PROT UR STRIP-MCNC: NEGATIVE MG/DL
RBC # UR STRIP: ABNORMAL /UL
SP GR UR: 1.01 (ref 1–1.03)
UROBILINOGEN UR QL: NORMAL

## 2020-12-28 PROCEDURE — 99214 OFFICE O/P EST MOD 30 MIN: CPT | Performed by: NURSE PRACTITIONER

## 2020-12-28 NOTE — PROGRESS NOTES
Chief Complaint:          Chief Complaint   Patient presents with   • prostatitis     fu        HPI:   55 y.o. male.  Patient returns for 6 weeks follow-up on prostatitis today.  He reports he is doing relatively better and denies any discomfort today.      He has a significant history of BPH with urine frequency, recurrent chronic prostatitis which has been recalcitrant to multiple antibiotic therapy, and extensive unsuccessful prostate massage. Patient had a cystoscopy 3 weeks ago by Dr. 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  He was placed on antibiotic Bactrim for 6 weeks.  He reports significant improvement in his symptoms.      His urine dipstick today is completely negative for any infection, he has 1+ microscopic hematuria.  His IPSS score is 7.      Past Medical History:        Past Medical History:   Diagnosis Date   • Chronic prostatitis 10/23/2020   • Hypertension        The following portions of the patient's history were reviewed and updated as appropriate: allergies, current medications, past family history, past medical history, past social history, past surgical history and problem list.    Current Meds:     Current Outpatient Medications   Medication Sig Dispense Refill   • losartan-hydrochlorothiazide (HYZAAR) 50-12.5 MG per tablet Take 1 tablet by mouth Daily. FOR 30 DAYS     • polyethylene glycol (MIRALAX) 17 GM/SCOOP powder Take 17 g by mouth As Needed for Constipation.     • tamsulosin (Flomax) 0.4 MG capsule 24 hr capsule Take 1 capsule by mouth Every Night. 30 capsule 5     No current facility-administered medications for this visit.         Allergies:      Allergies   Allergen Reactions   • Penicillins Hives        Past Surgical History:     Past Surgical History:   Procedure Laterality Date   • COLONOSCOPY  2015    Dr. jack- colon polyps, did not have sedation due to fear         Social History:     Social History      Socioeconomic History   • Marital status:      Spouse name: Not on file   • Number of children: Not on file   • Years of education: Not on file   • Highest education level: Not on file   Tobacco Use   • Smoking status: Never Smoker   • Smokeless tobacco: Never Used   Substance and Sexual Activity   • Alcohol use: Never     Frequency: Never   • Drug use: Never   • Sexual activity: Defer       Family History:     Family History   Problem Relation Age of Onset   • Heart disease Father    • Hypertension Father    • Heart disease Mother    • Hypertension Mother    • Cancer Mother        Review of Systems:     Review of Systems   Constitutional: Negative for activity change, appetite change, chills, fatigue and fever.   HENT: Negative for congestion and sinus pressure.    Eyes: Negative for blurred vision and double vision.   Respiratory: Negative for shortness of breath and wheezing.    Cardiovascular: Negative for chest pain.   Gastrointestinal: Negative for abdominal pain, constipation, diarrhea, nausea and vomiting.   Genitourinary: Positive for hematuria. Negative for difficulty urinating, discharge, dysuria, flank pain, frequency, genital sores, penile pain, penile swelling, scrotal swelling, testicular pain, urgency and urinary incontinence.   Musculoskeletal: Negative for back pain and neck pain.   Neurological: Negative for dizziness, weakness, headache and confusion.   Hematological: Does not bruise/bleed easily.   Psychiatric/Behavioral: Negative for agitation, behavioral problems and decreased concentration. The patient is not nervous/anxious.       IPSS Questionnaire (AUA-7):  Over the past month…    1)  How often have you had a sensation of not emptying your bladder completely after you finish urinating?  1 - Less than 1 time in 5   2)  How often have you had to urinate again less than two hours after you finished urinating? 2 - Less than half the time   3)  How often have you found you stopped  and started again several times when you urinated?  0 - Not at all   4) How difficult have you found it to postpone urination?  1 - Less than 1 time in 5   5) How often have you had a weak urinary stream?  0 - Not at all   6) How often have you had to push or strain to begin urination?  1 - Less than 1 time in 5   7) How many times did you most typically get up to urinate from the time you went to bed until the time you got up in the morning?  2 - 2 times   Total score:  0-7 mildly symptomatic                                                          7    8-19 moderately symptomatic    20-35 severely symptomatic       Physical Exam:     Physical Exam  Constitutional:       General: He is not in acute distress.     Appearance: He is well-developed.   HENT:      Head: Normocephalic and atraumatic.      Right Ear: External ear normal.      Left Ear: External ear normal.   Eyes:      General:         Right eye: No discharge.         Left eye: No discharge.      Conjunctiva/sclera: Conjunctivae normal.      Pupils: Pupils are equal, round, and reactive to light.   Neck:      Musculoskeletal: Normal range of motion and neck supple.      Thyroid: No thyromegaly.      Trachea: No tracheal deviation.   Cardiovascular:      Rate and Rhythm: Normal rate and regular rhythm.      Heart sounds: No murmur. No friction rub.   Pulmonary:      Effort: Pulmonary effort is normal. No respiratory distress.      Breath sounds: Normal breath sounds. No stridor.   Abdominal:      General: Bowel sounds are normal. There is no distension.      Palpations: Abdomen is soft.      Tenderness: There is abdominal tenderness. There is no guarding.   Genitourinary:     Penis: Normal and uncircumcised. No tenderness or discharge.       Scrotum/Testes: Normal.      Rectum: Normal. Guaiac result negative.   Musculoskeletal: Normal range of motion.         General: Tenderness present. No deformity.   Skin:     General: Skin is warm and dry.       Capillary Refill: Capillary refill takes less than 2 seconds.      Coloration: Skin is not pale.   Neurological:      Mental Status: He is alert and oriented to person, place, and time.      Cranial Nerves: No cranial nerve deficit.      Coordination: Coordination normal.   Psychiatric:         Behavior: Behavior normal.         Thought Content: Thought content normal.         Judgment: Judgment normal.         Procedure:       Assessment/Plan:                                                      ASSESSMENT  Chronic Prostatitis: The patient returns to clinic today for his 6 weeks follow-up on prostatitis.  He is currently on antibiotic Bactrim reports a significant improvement in his symptoms.  He denies having any urinary symptoms today, his urine dipstick is complete negative for any infection, he has 1+ microscopic hematuria, his IPSS score is 7.      Again, We discussed the differential diagnosis of prostatitis including the National Institutes of Health classification system I through IV.  I discussed that type I prostatitis as  acute bacterial prostatitis and an associated with high fevers typically hematuria and severe pain with voiding. We discussed the fact that it necessitates 6 weeks of chronic antibiotics to be sure it doesn't turn into a chronic bacterial prostatitis that can have lifelong ramifications.  We discussed National Institutes of Health type II chronic bacterial prostatitis.  We discussed the fact that this a chronic bacterial infection of the prostate that often requires suppressive antibiotics.      We discussed type III being related more to nonspecific urethritis as well as tight for being related to finding inflammation and a biopsy in the absence of symptomatology.  I discussed the diagnostic strategies including the VB 1 through 4 urine culture and discussed empiric therapy. Additionally, we discussed the various antibiotics used and the risks and benefits associated with each  particularly the use of long-term ciprofloxacin and the effect on joints and collagen in human beings                                         PLAN  Continue Bactrim DS BID until finished.  Patient has tolerated medication relatively well.      I emphasized that with the use of chronic antibiotics I strongly recommended the concomitant use of probiotics.  Patient reports diligent use of probiotics.    Continue his Flomax 0.4 mg daily as directed.     Discussed some home remedies for acute prostatitis and encouraged patient to engage in taking warm showers or baths as tolerated for comfort, avoiding activities that put pressure on the prostate such as bicycling, sitting on hard surfaces for longer.  Encourage sitting on a donut or  cushion, avoiding bladder irritants such as caffeine, alcohol, reducing or avoiding consumption of spicy foods, and increasing p.o. fluid intake to at least 2 to 3 L of water daily     See him back in 6 months for follow-up, to reevaluate his symptoms at that time    We also recheck his prostate exam, PSA free and total at that time.     Patient is agreeable plan of care    Patient reports that he is not currently experiencing any symptoms of urinary incontinence.    Patient's Body mass index is 32.87 kg/m². BMI is above normal parameters. Recommendations include: educational material, exercise counseling and nutrition counseling.    Smoking Cessation Counseling:  Never a smoker.  Patient does not currently use any tobacco products.     Counseling was given to patient for the following topics diagnostic results including: Chronic prostatitis, BPH and urine frequency, instructions for management as follows: Increase p.o. fluid intake, Bactrim DS twice daily x6 weeks, continue Flomax, prostate massage, and risk factor reductions including: Bladder irritants such as caffeine products, coffee, tea, citrusy/spicy foods.. The interim medical history and current results were reviewed.  A treatment  plan with follow-up was made FOR BPH with obstruction/lower urinary tract symptoms,  Chronic prostatitis [N41.1].          This document has been electronically signed by Griselda Cheng-Akwa, APRN December 29, 2020 23:03 EST

## 2021-03-05 ENCOUNTER — HOSPITAL ENCOUNTER (OUTPATIENT)
Dept: HOSPITAL 79 - RAD | Age: 56
End: 2021-03-05
Attending: NURSE PRACTITIONER
Payer: COMMERCIAL

## 2021-03-05 DIAGNOSIS — M25.551: Primary | ICD-10-CM

## 2021-03-05 DIAGNOSIS — M25.572: ICD-10-CM

## 2021-03-05 DIAGNOSIS — M25.552: ICD-10-CM

## 2021-03-05 DIAGNOSIS — M79.672: ICD-10-CM

## 2021-03-10 ENCOUNTER — OFFICE VISIT (OUTPATIENT)
Dept: GASTROENTEROLOGY | Facility: CLINIC | Age: 56
End: 2021-03-10

## 2021-03-10 VITALS
WEIGHT: 255 LBS | HEART RATE: 100 BPM | BODY MASS INDEX: 32.73 KG/M2 | OXYGEN SATURATION: 98 % | TEMPERATURE: 98.3 F | DIASTOLIC BLOOD PRESSURE: 89 MMHG | HEIGHT: 74 IN | SYSTOLIC BLOOD PRESSURE: 156 MMHG

## 2021-03-10 DIAGNOSIS — K62.5 RECTAL BLEEDING: Primary | ICD-10-CM

## 2021-03-10 DIAGNOSIS — K64.8 INTERNAL HEMORRHOIDS: ICD-10-CM

## 2021-03-10 DIAGNOSIS — R12 HEARTBURN: ICD-10-CM

## 2021-03-10 PROCEDURE — 99214 OFFICE O/P EST MOD 30 MIN: CPT | Performed by: INTERNAL MEDICINE

## 2021-03-10 RX ORDER — PREDNISONE 10 MG/1
10 TABLET ORAL DAILY
COMMUNITY
End: 2022-11-28

## 2021-03-10 RX ORDER — HYDROCORTISONE ACETATE 25 MG/1
25 SUPPOSITORY RECTAL 2 TIMES DAILY PRN
Qty: 30 SUPPOSITORY | Refills: 5 | Status: SHIPPED | OUTPATIENT
Start: 2021-03-10 | End: 2022-11-28

## 2021-03-10 RX ORDER — OMEPRAZOLE 40 MG/1
40 CAPSULE, DELAYED RELEASE ORAL DAILY
Qty: 30 CAPSULE | Refills: 5 | Status: SHIPPED | OUTPATIENT
Start: 2021-03-10 | End: 2022-11-28 | Stop reason: SDUPTHER

## 2021-03-10 RX ORDER — KETOROLAC TROMETHAMINE 10 MG/1
10 TABLET, FILM COATED ORAL EVERY 6 HOURS PRN
COMMUNITY
End: 2022-11-28

## 2021-03-10 RX ORDER — SILDENAFIL CITRATE 20 MG/1
20 TABLET ORAL 3 TIMES DAILY
COMMUNITY

## 2021-03-10 NOTE — PROGRESS NOTES
Subjective   Mukesh Mccord is a 55 y.o. male who presents to the office today as a follow up appointment regarding Fecal Incontinence      History of Present Illness:  The patient presents for follow up constipation.  He is now having a bowel movement every day.  His stool is a Treutlen score 5.  He is taking Senna laxative.  He is taking fiber supplement and Probiotic.  There is having good evacuation of the bowels.  Ever, he is having rectal spasms.  There is no pain in the anus with stool passing but he complains of rectal pain.  He has itching and stinging sensation. He has noted rectal leakage of stool.  He underwent colonoscopy by Dr. Sharma last week.  It did show diverticulosis. He complains of heartburn.   It occurs every day.  The patient does wake up at night with heartburn.  He does not take a prescription medication for this.  He intermittently takes an over-the-counter medication.  His appetite is good.  He has not had weight loss.      Review of Systems:  Review of Systems   Constitutional: Positive for activity change, fatigue and unexpected weight change. Negative for chills.   HENT: Negative for sore throat and trouble swallowing.    Eyes: Negative.    Respiratory: Positive for shortness of breath. Negative for cough and chest tightness.    Cardiovascular: Positive for leg swelling.   Gastrointestinal: Positive for abdominal distention, abdominal pain, anal bleeding, blood in stool, constipation, nausea and rectal pain. Negative for diarrhea and vomiting.   Endocrine: Negative.    Genitourinary: Positive for difficulty urinating, frequency, hematuria, penile pain and urgency.   Musculoskeletal: Positive for back pain and neck pain.   Skin: Positive for rash.   Allergic/Immunologic: Negative for environmental allergies and food allergies.   Neurological: Negative for dizziness and headaches.   Hematological: Bruises/bleeds easily.   Psychiatric/Behavioral: Positive for agitation and sleep  disturbance. The patient is nervous/anxious.        Past Medical History:  Past Medical History:   Diagnosis Date   • Anal fistula    • Chronic prostatitis 10/23/2020   • Hypertension    • Leaky heart valve    • Levator syndrome    • Proctalgia fugax    • Prostatitis        Past Surgical History:  Past Surgical History:   Procedure Laterality Date   • COLONOSCOPY  2015    Dr. jack- colon polyps, did not have sedation due to fear       Family History:  Family History   Problem Relation Age of Onset   • Heart disease Father    • Hypertension Father    • Heart disease Mother    • Hypertension Mother    • Cancer Mother        Social History:  Social History     Socioeconomic History   • Marital status:      Spouse name: Not on file   • Number of children: Not on file   • Years of education: Not on file   • Highest education level: Not on file   Tobacco Use   • Smoking status: Never Smoker   • Smokeless tobacco: Never Used   Substance and Sexual Activity   • Alcohol use: Never   • Drug use: Never   • Sexual activity: Defer       Current Medication List:    Current Outpatient Medications:   •  ketorolac (TORADOL) 10 MG tablet, Take 10 mg by mouth Every 6 (Six) Hours As Needed for Moderate Pain ., Disp: , Rfl:   •  losartan-hydrochlorothiazide (HYZAAR) 50-12.5 MG per tablet, Take 1 tablet by mouth Daily. FOR 30 DAYS, Disp: , Rfl:   •  polyethylene glycol (MIRALAX) 17 GM/SCOOP powder, Take 17 g by mouth As Needed for Constipation., Disp: , Rfl:   •  predniSONE (DELTASONE) 10 MG tablet, Take 10 mg by mouth Daily., Disp: , Rfl:   •  sildenafil (REVATIO) 20 MG tablet, Take 20 mg by mouth 3 (Three) Times a Day., Disp: , Rfl:   •  hydrocortisone (ANUSOL-HC) 25 MG suppository, Insert 1 suppository into the rectum 2 (Two) Times a Day As Needed for Hemorrhoids (rectal discomfort)., Disp: 30 suppository, Rfl: 5  •  omeprazole (priLOSEC) 40 MG capsule, Take 1 capsule by mouth Daily., Disp: 30 capsule, Rfl: 5    Allergies:  "  Penicillins    Vitals:  /89 (BP Location: Left arm, Patient Position: Sitting, Cuff Size: Adult)   Pulse 100   Temp 98.3 °F (36.8 °C)   Ht 188 cm (74\")   Wt 116 kg (255 lb)   SpO2 98%   BMI 32.74 kg/m²     Physical Exam:  Physical Exam  Constitutional:       Appearance: He is normal weight.   HENT:      Head: Normocephalic and atraumatic.      Nose: Nose normal. No congestion or rhinorrhea.   Eyes:      General: No scleral icterus.     Extraocular Movements: Extraocular movements intact.      Conjunctiva/sclera: Conjunctivae normal.      Pupils: Pupils are equal, round, and reactive to light.   Cardiovascular:      Rate and Rhythm: Normal rate and regular rhythm.      Pulses: Normal pulses.      Heart sounds: Normal heart sounds.   Pulmonary:      Effort: Pulmonary effort is normal.      Breath sounds: Normal breath sounds.   Abdominal:      General: Abdomen is flat. Bowel sounds are normal. There is no distension.      Palpations: Abdomen is soft. There is no shifting dullness, fluid wave, hepatomegaly, splenomegaly, mass or pulsatile mass.      Tenderness: There is no abdominal tenderness. There is no guarding or rebound.      Hernia: No hernia is present.   Genitourinary:     Comments: Anal rectal exam was performed.  Palpable internal hemorrhoids.  Musculoskeletal:         General: No swelling or tenderness.      Cervical back: Normal range of motion and neck supple.   Skin:     General: Skin is warm and dry.      Coloration: Skin is not jaundiced.   Neurological:      General: No focal deficit present.      Mental Status: He is alert and oriented to person, place, and time.   Psychiatric:         Mood and Affect: Mood normal.         Behavior: Behavior normal.         Results Review:  Lab Results:   No visits with results within 1 Month(s) from this visit.   Latest known visit with results is:   Office Visit on 12/28/2020   Component Date Value Ref Range Status   • Color 12/28/2020 Yellow  Yellow, " Straw, Dark Yellow, Margarita Final   • Clarity, UA 12/28/2020 Clear  Clear Final   • Specific Gravity  12/28/2020 1.010  1.005 - 1.030 Final   • pH, Urine 12/28/2020 6.5  5.0 - 8.0 Final   • Leukocytes 12/28/2020 Negative  Negative Final   • Nitrite, UA 12/28/2020 Negative  Negative Final   • Protein, POC 12/28/2020 Negative  Negative mg/dL Final   • Glucose, UA 12/28/2020 Negative  Negative, 1000 mg/dL (3+) mg/dL Final   • Ketones, UA 12/28/2020 Negative  Negative Final   • Urobilinogen, UA 12/28/2020 Normal  Normal Final   • Bilirubin 12/28/2020 Negative  Negative Final   • Blood, UA 12/28/2020 1+* Negative Final       Assessment/Plan     Visit Diagnoses:    ICD-10-CM ICD-9-CM   1. Rectal bleeding  K62.5 569.3   2. Heartburn  R12 787.1   3. Internal hemorrhoids  K64.8 455.0       Plan:  I will start the patient on omeprazole for heartburn and reflux.  He would take this daily 30 minutes prior to eating.  He will return in 8 weeks to see if he is improved.  If not, I will proceed with a EGD.  I have also prescribed Anusol HC for internal hemorrhoids.  This is likely the source of his anal itching and stinging.  He will follow-up in 8 weeks.    * Surgery not found *      MEDICATION ISSUES:  Discussed medication options and treatment plan of prescribed medication as well as the risks, benefits, and side effects including potential falls, possible impaired driving and metabolic adversities among others. Patient is agreeable to call the office with any worsening of symptoms or onset of side effects. Patient is agreeable to call 911 or go to the nearest ER should he/she begin having SI/HI.     MEDS ORDERED DURING VISIT:  New Medications Ordered This Visit   Medications   • omeprazole (priLOSEC) 40 MG capsule     Sig: Take 1 capsule by mouth Daily.     Dispense:  30 capsule     Refill:  5   • hydrocortisone (ANUSOL-HC) 25 MG suppository     Sig: Insert 1 suppository into the rectum 2 (Two) Times a Day As Needed for  Hemorrhoids (rectal discomfort).     Dispense:  30 suppository     Refill:  5       Return in about 8 weeks (around 5/5/2021).             This document has been electronically signed by Grace Enamorado MD   March 10, 2021 16:53 EST      Part of this note may be an electronic transcription/translation of spoken language to printed text using the Dragon Dictation System.

## 2021-04-20 ENCOUNTER — HOSPITAL ENCOUNTER (OUTPATIENT)
Dept: HOSPITAL 79 - LAB | Age: 56
End: 2021-04-20
Attending: NURSE PRACTITIONER
Payer: COMMERCIAL

## 2021-04-20 DIAGNOSIS — D89.9: ICD-10-CM

## 2021-04-20 DIAGNOSIS — R76.8: ICD-10-CM

## 2021-04-20 DIAGNOSIS — Z87.898: Primary | ICD-10-CM

## 2021-04-20 DIAGNOSIS — M25.50: ICD-10-CM

## 2021-04-20 LAB
BUN/CREATININE RATIO: 16 (ref 0–10)
HGB BLD-MCNC: 15.1 GM/DL (ref 14–17.5)
RED BLOOD COUNT: 5 M/UL (ref 4.2–5.5)
WHITE BLOOD COUNT: 5.5 K/UL (ref 4.5–11)

## 2021-04-21 LAB
RHEUMATOID ARTHRITIS FACTOR: <10 IU/ML (ref 0–13.9)
VITAMIN D, 25-HYDROXY: 19.1 NG/ML (ref 30–100)

## 2021-04-22 LAB — CCP ANTIBODIES IGG/IGA: 4 UNITS (ref 0–19)

## 2021-06-28 ENCOUNTER — HOSPITAL ENCOUNTER (OUTPATIENT)
Dept: GENERAL RADIOLOGY | Facility: HOSPITAL | Age: 56
Discharge: HOME | End: 2021-06-28
Admitting: NURSE PRACTITIONER

## 2021-06-28 ENCOUNTER — OFFICE VISIT (OUTPATIENT)
Dept: UROLOGY | Facility: CLINIC | Age: 56
End: 2021-06-28

## 2021-06-28 VITALS — BODY MASS INDEX: 32.73 KG/M2 | HEIGHT: 74 IN | WEIGHT: 255 LBS

## 2021-06-28 DIAGNOSIS — N20.0 RENAL CALCULUS, RIGHT: ICD-10-CM

## 2021-06-28 DIAGNOSIS — N52.9 ERECTILE DYSFUNCTION, UNSPECIFIED ERECTILE DYSFUNCTION TYPE: ICD-10-CM

## 2021-06-28 DIAGNOSIS — R10.9 ACUTE RIGHT FLANK PAIN: ICD-10-CM

## 2021-06-28 DIAGNOSIS — N40.0 BPH WITHOUT OBSTRUCTION/LOWER URINARY TRACT SYMPTOMS: Primary | ICD-10-CM

## 2021-06-28 LAB
BILIRUB BLD-MCNC: ABNORMAL MG/DL
CLARITY, POC: CLEAR
COLOR UR: ABNORMAL
GLUCOSE UR STRIP-MCNC: NEGATIVE MG/DL
KETONES UR QL: NEGATIVE
LEUKOCYTE EST, POC: ABNORMAL
NITRITE UR-MCNC: NEGATIVE MG/ML
PH UR: 6 [PH] (ref 5–8)
PROT UR STRIP-MCNC: NEGATIVE MG/DL
RBC # UR STRIP: ABNORMAL /UL
SP GR UR: 1.02 (ref 1–1.03)
UROBILINOGEN UR QL: NORMAL

## 2021-06-28 PROCEDURE — 74018 RADEX ABDOMEN 1 VIEW: CPT | Performed by: RADIOLOGY

## 2021-06-28 PROCEDURE — 87086 URINE CULTURE/COLONY COUNT: CPT | Performed by: NURSE PRACTITIONER

## 2021-06-28 PROCEDURE — 84153 ASSAY OF PSA TOTAL: CPT | Performed by: NURSE PRACTITIONER

## 2021-06-28 PROCEDURE — 74018 RADEX ABDOMEN 1 VIEW: CPT

## 2021-06-28 PROCEDURE — 99214 OFFICE O/P EST MOD 30 MIN: CPT | Performed by: NURSE PRACTITIONER

## 2021-06-28 RX ORDER — SILDENAFIL 100 MG/1
100 TABLET, FILM COATED ORAL AS NEEDED
Qty: 30 TABLET | Refills: 1 | Status: SHIPPED | OUTPATIENT
Start: 2021-06-28

## 2021-06-28 RX ORDER — MELOXICAM 15 MG/1
15 TABLET ORAL DAILY
COMMUNITY
Start: 2021-04-20 | End: 2022-11-28

## 2021-06-28 NOTE — PROGRESS NOTES
"Chief Complaint  PROSTATITIS/BPH/ED/BILATERAL FLANK PAIN (6 MONTH FOLLOW UP)    Subjective          Mukesh Mccord presents to [facility] for   History of Present Illness    Mr. Raymond Mccord  is a pleasant 55 y.o. male Patient returns for 6 months follow-up on prostatitis today.  He reports he is doing relatively better and denies any discomfort today from his prostate.  He denies having any perineal discomfort, he however reports bilateral flank pain, lower abdominal pain, and right groin discomfort.  His most recent PSA was 1.590, noncompliant with Flomax, he does not take finasteride as prescribed.      He has a significant history of BPH with urine frequency, recurrent chronic prostatitis which has been recalcitrant to multiple antibiotic therapy, and extensive unsuccessful prostate massage in the past. Patient had a cystoscopy 6 months ago by Dr. Remy that showed extensive polypoid prostatitis in the fossa, with  no visual obstruction noted.  He was placed on antibiotic Bactrim for 6 weeks.  He reports significant improvement in his symptoms.       His urine dipstick today however show trace leukocyte esterase, negative nitrates, trace microscopic hematuria.  His PVR is 16 cc, his IPSS score is 9.  Objective   Vital Signs:   Ht 188 cm (74\")   Wt 116 kg (255 lb)   BMI 32.74 kg/m²     Physical Exam  Constitutional:       General: He is not in acute distress.     Appearance: He is well-developed. He is obese.   HENT:      Head: Normocephalic and atraumatic.      Right Ear: External ear normal.      Left Ear: External ear normal.   Eyes:      General:         Right eye: No discharge.         Left eye: No discharge.      Conjunctiva/sclera: Conjunctivae normal.      Pupils: Pupils are equal, round, and reactive to light.   Neck:      Thyroid: No thyromegaly.      Trachea: No tracheal deviation.   Cardiovascular:      Rate and Rhythm: Normal rate and regular " rhythm.      Heart sounds: No murmur heard.   No friction rub.   Pulmonary:      Effort: Pulmonary effort is normal. No respiratory distress.      Breath sounds: Normal breath sounds. No stridor.   Abdominal:      General: Bowel sounds are normal. There is distension.      Palpations: Abdomen is soft.      Tenderness: There is abdominal tenderness. There is no guarding.   Genitourinary:     Penis: Normal and uncircumcised. No tenderness or discharge.       Testes: Normal.      Rectum: Normal. Guaiac result negative.      Comments: KEISHA negative for nodules, He has good rectal tone, and ENlarge smooth firm prostate. There is no nodularity or any suspicious rectal abnormalities  Right groin pain/tenderness otherwise normal external genitalia. Bilateral descended testes without any masses, left slightly swollen and hanging lower than the right testicles. There are no palpable inguinal hernias or abnormalities noted.     Musculoskeletal:         General: No tenderness or deformity. Normal range of motion.      Cervical back: Normal range of motion and neck supple.   Skin:     General: Skin is warm and dry.      Capillary Refill: Capillary refill takes less than 2 seconds.      Coloration: Skin is not pale.   Neurological:      Mental Status: He is alert and oriented to person, place, and time.      Cranial Nerves: No cranial nerve deficit.      Coordination: Coordination normal.   Psychiatric:         Behavior: Behavior normal.         Thought Content: Thought content normal.         Judgment: Judgment normal.        Result Review :           UA    Urinalysis 11/12/20 12/28/20 6/28/21   Ketones, UA Negative Negative Negative   Leukocytes, UA Negative Negative Small (1+) (A)   (A) Abnormal value            Urine Culture    Urine Culture 6/28/21   Urine Culture No growth           PSA    PSA 6/28/21   PSA 1.130           Data reviewed: Radiologic studies KUB done today 06/28/2021          Assessment and Plan {CC Problem  List  Visit Diagnosis  ROS  Review (Popup)  [facility]  Quality  BestPractice  Medications  SmartSets  SnapShot Encounters  Media :23}   Problem List Items Addressed This Visit     None      Visit Diagnoses     BPH without obstruction/lower urinary tract symptoms    -  Primary    Relevant Medications    sildenafil (Viagra) 100 MG tablet    Other Relevant Orders    PSA DIAGNOSTIC (Completed)    POC Urinalysis Dipstick, Automated (Completed)    Urine Culture - Urine, Urine, Clean Catch (Completed)    Erectile dysfunction, unspecified erectile dysfunction type        Relevant Medications    sildenafil (Viagra) 100 MG tablet    Renal calculus, right        Relevant Orders    XR abdomen kub (Completed)    Acute right flank pain        Relevant Orders    XR abdomen kub (Completed)    Urine Culture - Urine, Urine, Clean Catch (Completed)        BPH/ BILATERAL FLANK PAIN/ ED/ PROSTATITIS -RESOLVED    We both reviewed/discussed his KUB results today which showed bilateral nephrolithiasis.  That There is a 3.4 mm calcification overlying the lower pole of the left  kidney. There are 3 calcifications overlying the right kidney. These are in the 2-3 mm size range. They should all be in nonobstructive positions. No calcifications were identified overlying the ureters.    We discussed the presence of the current stones. We discussed the various therapeutic options.  We discussed the absolute relative indicators for intervention including the presence of sepsis, and pain we cannot control as the primary need for urgent intervention.     Discussed some home remedies for acute prostatitis and encouraged patient to engage in taking warm showers or baths as tolerated for comfort, avoiding activities that put pressure on the prostate such as bicycling, sitting on hard surfaces for longer.  Encourage sitting on a donut or  cushion, avoiding bladder irritants such as caffeine, alcohol, reducing or avoiding consumption  of spicy foods, and increasing p.o. fluid intake to at least 2 to 3 L of water daily    The patient desires Viagra to treat his erectile dysfunction. History and physical exam has not disclosed any obvious treatable cause of this complaint. He is informed that Viagra is sometimes not covered by insurance. It is available on a fee-for-service cost basis, and is relatively expensive. He can start with 50 mg dose, and increase to 100 mg if necessary. The method of use 1 hour prior to anticipated intercourse is explained. He should not use any more than one tablet in a 24 hour period. The side effects of possible headache, flushing, dyspepsia and transient changes in vision have been explained.    The patient is not taking nitrates, and denies he has access to nitrates in any form at any time. I have counseled him that taking Viagra with nitrates of any form can cause death. Additionally, Viagra serum concentrations can be increased by the following: cimetidine, erythromycin, itraconazole or ketoconazole. This patient does not take these drugs, but I have counseled him to avoid Viagra if he does take any of these    See him back for follow-up, to reevaluate his symptoms at that time     We also recheck his prostate exam, PSA free and total at that time.     Patient is agreeable plan of   Patient reports that he is not currently experiencing any symptoms of urinary incontinence.    Patient's Body mass index is 32.74 kg/m². indicating that he is obese (BMI >30). Obesity-related health conditions include the following: hypertension, dyslipidemias and GERD. Obesity is improving with lifestyle modifications. BMI is 32.74. We discussed portion control and increasing exercise..    Smoking Cessation Counseling:  Never a smoker.  Patient does not currently use any tobacco products.   .      Follow Up   Return in about 1 year (around 6/28/2022) for Next scheduled follow up WITH PROSTATE EXAM/PSA/ED.  Patient was given  instructions and counseling regarding his condition or for health maintenance advice. Please see specific information pulled into the AVS if appropriate.

## 2021-06-29 ENCOUNTER — TELEPHONE (OUTPATIENT)
Dept: UROLOGY | Facility: CLINIC | Age: 56
End: 2021-06-29

## 2021-06-29 LAB — PSA SERPL-MCNC: 1.13 NG/ML (ref 0–4)

## 2021-06-30 LAB — BACTERIA SPEC AEROBE CULT: NO GROWTH

## 2021-07-26 ENCOUNTER — OFFICE VISIT (OUTPATIENT)
Dept: GASTROENTEROLOGY | Facility: CLINIC | Age: 56
End: 2021-07-26

## 2021-07-26 VITALS — BODY MASS INDEX: 32.73 KG/M2 | HEIGHT: 74 IN | WEIGHT: 255 LBS

## 2021-07-26 DIAGNOSIS — K21.9 GASTROESOPHAGEAL REFLUX DISEASE WITHOUT ESOPHAGITIS: ICD-10-CM

## 2021-07-26 DIAGNOSIS — K59.04 CHRONIC IDIOPATHIC CONSTIPATION: Primary | ICD-10-CM

## 2021-07-26 DIAGNOSIS — R10.30 LOWER ABDOMINAL PAIN: ICD-10-CM

## 2021-07-26 PROCEDURE — 99213 OFFICE O/P EST LOW 20 MIN: CPT | Performed by: PHYSICIAN ASSISTANT

## 2021-07-26 RX ORDER — CALCIUM POLYCARBOPHIL 625 MG
625 TABLET ORAL 2 TIMES DAILY
Qty: 30 TABLET | Refills: 5 | Status: SHIPPED | OUTPATIENT
Start: 2021-07-26 | End: 2022-11-28

## 2021-07-26 NOTE — PROGRESS NOTES
Chief Complaint   Patient presents with   • Abdominal Pain       Mukesh Mccord is a 56 y.o. male who presents to the office today for evaluation of Abdominal Pain  .    HPI  Patient presents the clinic today for follow-up of his acid reflux symptoms along with new onset abdominal pain.  Patient states that he is doing well since his last visit with Dr. English.  He was placed on omeprazole 40 mg once daily and this has completely controlled his reflux symptoms.  He states that he was straining to have a bowel movement a few days ago and noticed some pain on his right lower side that she is afraid it could possibly be a hernia.  There is no bulging at this that only slight dull pain.  He is no longer having blood in stool however states his internal hemorrhoids do aggravate him at times.  He takes fiber daily to help with constipation but has been off of it a few weeks due to it being hard to find and stored.    Review of Systems   Constitutional: Positive for activity change, fatigue and unexpected weight change. Negative for chills.   HENT: Negative for sore throat and trouble swallowing.    Eyes: Negative.    Respiratory: Positive for shortness of breath. Negative for cough and chest tightness.    Cardiovascular: Positive for leg swelling.   Gastrointestinal: Positive for abdominal distention, abdominal pain, anal bleeding, blood in stool, constipation, nausea and rectal pain. Negative for diarrhea and vomiting.   Endocrine: Negative.    Genitourinary: Positive for difficulty urinating, frequency, hematuria, penile pain and urgency.   Musculoskeletal: Positive for back pain and neck pain.   Skin: Positive for rash.   Allergic/Immunologic: Negative for environmental allergies and food allergies.   Neurological: Negative for dizziness and headaches.   Hematological: Bruises/bleeds easily.   Psychiatric/Behavioral: Positive for agitation and sleep disturbance. The patient is nervous/anxious.        ACTIVE  PROBLEMS:   Specialty Problems        Gastroenterology Problems    Fatty liver              PAST MEDICAL HISTORY:  Past Medical History:   Diagnosis Date   • Anal fistula    • Chronic prostatitis 10/23/2020   • Hypertension    • Leaky heart valve    • Levator syndrome    • Proctalgia fugax    • Prostatitis        SURGICAL HISTORY:  Past Surgical History:   Procedure Laterality Date   • COLONOSCOPY  2015    Dr. jack- colon polyps, did not have sedation due to fear       FAMILY HISTORY:  Family History   Problem Relation Age of Onset   • Heart disease Father    • Hypertension Father    • Heart disease Mother    • Hypertension Mother    • Cancer Mother        SOCIAL HISTORY:  Social History     Tobacco Use   • Smoking status: Never Smoker   • Smokeless tobacco: Never Used   Substance Use Topics   • Alcohol use: Never       CURRENT MEDICATION:    Current Outpatient Medications:   •  hydrocortisone (ANUSOL-HC) 25 MG suppository, Insert 1 suppository into the rectum 2 (Two) Times a Day As Needed for Hemorrhoids (rectal discomfort)., Disp: 30 suppository, Rfl: 5  •  ketorolac (TORADOL) 10 MG tablet, Take 10 mg by mouth Every 6 (Six) Hours As Needed for Moderate Pain ., Disp: , Rfl:   •  losartan-hydrochlorothiazide (HYZAAR) 50-12.5 MG per tablet, Take 1 tablet by mouth Daily. FOR 30 DAYS, Disp: , Rfl:   •  meloxicam (MOBIC) 15 MG tablet, Take 15 mg by mouth Daily. with food., Disp: , Rfl:   •  omeprazole (priLOSEC) 40 MG capsule, Take 1 capsule by mouth Daily., Disp: 30 capsule, Rfl: 5  •  polyethylene glycol (MIRALAX) 17 GM/SCOOP powder, Take 17 g by mouth As Needed for Constipation., Disp: , Rfl:   •  predniSONE (DELTASONE) 10 MG tablet, Take 10 mg by mouth Daily., Disp: , Rfl:   •  sildenafil (REVATIO) 20 MG tablet, Take 20 mg by mouth 3 (Three) Times a Day., Disp: , Rfl:   •  sildenafil (Viagra) 100 MG tablet, Take 1 tablet by mouth As Needed for Erectile Dysfunction., Disp: 30 tablet, Rfl: 1  •  polycarbophil  "(calcium polycarbophil) 625 MG tablet tablet, Take 1 tablet by mouth 2 (Two) Times a Day., Disp: 30 tablet, Rfl: 5    ALLERGIES:  Penicillins    VISIT VITALS:  Ht 188 cm (74\")   Wt 116 kg (255 lb)   BMI 32.74 kg/m²   Physical Exam  Constitutional:       General: He is not in acute distress.     Appearance: Normal appearance. He is well-developed.   HENT:      Head: Normocephalic and atraumatic.   Eyes:      Pupils: Pupils are equal, round, and reactive to light.   Cardiovascular:      Rate and Rhythm: Normal rate and regular rhythm.      Heart sounds: Normal heart sounds.   Pulmonary:      Effort: Pulmonary effort is normal. No respiratory distress.      Breath sounds: Normal breath sounds. No wheezing, rhonchi or rales.   Abdominal:      General: Abdomen is flat. Bowel sounds are normal. There is no distension.      Palpations: Abdomen is soft. There is no mass.      Tenderness: There is no abdominal tenderness. There is no guarding or rebound.      Hernia: No hernia is present.   Musculoskeletal:         General: No swelling. Normal range of motion.      Cervical back: Normal range of motion and neck supple.      Right lower leg: No edema.      Left lower leg: No edema.   Skin:     General: Skin is warm and dry.   Neurological:      Mental Status: He is alert and oriented to person, place, and time.   Psychiatric:         Attention and Perception: Attention normal.         Mood and Affect: Mood normal.         Speech: Speech normal.         Behavior: Behavior normal. Behavior is cooperative.         Thought Content: Thought content normal.           Assessment/Plan   Patient states he is doing well since starting the omeprazole 40 mg, we will keep him on this medication and refill for 1 year.  He is also taking over-the-counter fiber and has had a hard time finding that.  We will call him in some FiberCon tabs twice daily.  He will follow-up in 1 year   Diagnosis Plan   1. Chronic idiopathic constipation  " polycarbophil (calcium polycarbophil) 625 MG tablet tablet   2. Lower abdominal pain     3. Gastroesophageal reflux disease without esophagitis         Return in about 1 year (around 7/26/2022).                   This document has been electronically signed by Benitez Elizondo PA-C  July 26, 2021 13:04 EDT    Part of this note may be an electronic transcription/translation of spoken language to printed text using the Dragon Dictation System.

## 2021-10-08 ENCOUNTER — HOSPITAL ENCOUNTER (OUTPATIENT)
Dept: HOSPITAL 79 - KOH-I | Age: 56
End: 2021-10-08
Attending: PSYCHIATRY & NEUROLOGY
Payer: COMMERCIAL

## 2021-10-08 DIAGNOSIS — M47.816: ICD-10-CM

## 2021-10-08 DIAGNOSIS — M47.817: ICD-10-CM

## 2021-10-08 DIAGNOSIS — M51.26: ICD-10-CM

## 2021-10-08 DIAGNOSIS — M54.18: Primary | ICD-10-CM

## 2021-10-08 DIAGNOSIS — M51.86: ICD-10-CM

## 2021-10-08 DIAGNOSIS — M48.061: ICD-10-CM

## 2021-12-30 ENCOUNTER — TELEPHONE (OUTPATIENT)
Dept: UROLOGY | Facility: CLINIC | Age: 56
End: 2021-12-30

## 2021-12-30 NOTE — TELEPHONE ENCOUNTER
Caller: DERICK     Relationship: FROM [facility]    Best call back number: [phone]    What form or medical record are you requesting: LAST OFFICE NOTE W/ CHENG-AKWA, GRISELDA APRN     Who is requesting this form or medical record from you: [facility]     How would you like to receive the form or medical records (pick-up, mail, fax): FAX   If fax, what is the fax number: [phone]    Timeframe paperwork needed: ASAP

## 2022-04-27 ENCOUNTER — HOSPITAL ENCOUNTER (OUTPATIENT)
Dept: HOSPITAL 79 - HEART 5 | Age: 57
End: 2022-04-27
Attending: INTERNAL MEDICINE
Payer: COMMERCIAL

## 2022-04-27 DIAGNOSIS — M79.606: Primary | ICD-10-CM

## 2022-08-03 ENCOUNTER — HOSPITAL ENCOUNTER (OUTPATIENT)
Dept: HOSPITAL 79 - LAB | Age: 57
End: 2022-08-03
Attending: PHYSICIAN ASSISTANT
Payer: COMMERCIAL

## 2022-08-03 DIAGNOSIS — I10: Primary | ICD-10-CM

## 2022-08-03 LAB — BUN/CREATININE RATIO: 14 (ref 0–10)

## 2022-09-09 ENCOUNTER — HOSPITAL ENCOUNTER (OUTPATIENT)
Dept: HOSPITAL 79 - RAD | Age: 57
End: 2022-09-09
Attending: NURSE PRACTITIONER
Payer: COMMERCIAL

## 2022-09-09 DIAGNOSIS — G47.30: ICD-10-CM

## 2022-09-09 DIAGNOSIS — N52.1: ICD-10-CM

## 2022-09-09 DIAGNOSIS — I10: Primary | ICD-10-CM

## 2022-09-09 DIAGNOSIS — M50.322: ICD-10-CM

## 2022-11-28 ENCOUNTER — OFFICE VISIT (OUTPATIENT)
Dept: GASTROENTEROLOGY | Facility: CLINIC | Age: 57
End: 2022-11-28

## 2022-11-28 VITALS — WEIGHT: 250 LBS | BODY MASS INDEX: 32.08 KG/M2 | HEIGHT: 74 IN

## 2022-11-28 DIAGNOSIS — K59.04 CHRONIC IDIOPATHIC CONSTIPATION: Primary | ICD-10-CM

## 2022-11-28 DIAGNOSIS — R12 HEARTBURN: ICD-10-CM

## 2022-11-28 PROCEDURE — 99213 OFFICE O/P EST LOW 20 MIN: CPT | Performed by: PHYSICIAN ASSISTANT

## 2022-11-28 RX ORDER — CYCLOBENZAPRINE HCL 10 MG
10 TABLET ORAL 3 TIMES DAILY PRN
COMMUNITY

## 2022-11-28 RX ORDER — TAMSULOSIN HYDROCHLORIDE 0.4 MG/1
1 CAPSULE ORAL DAILY
COMMUNITY

## 2022-11-28 RX ORDER — OMEPRAZOLE 40 MG/1
40 CAPSULE, DELAYED RELEASE ORAL DAILY
Qty: 90 CAPSULE | Refills: 3 | Status: SHIPPED | OUTPATIENT
Start: 2022-11-28

## 2022-11-28 RX ORDER — SULFAMETHOXAZOLE AND TRIMETHOPRIM 800; 160 MG/1; MG/1
1 TABLET ORAL 2 TIMES DAILY
COMMUNITY

## 2022-11-28 RX ORDER — POLYETHYLENE GLYCOL 3350 17 G/17G
17 POWDER, FOR SOLUTION ORAL DAILY
Qty: 527 G | Refills: 11 | Status: SHIPPED | OUTPATIENT
Start: 2022-11-28

## 2022-11-28 NOTE — PROGRESS NOTES
Chief Complaint   Patient presents with   • Abdominal Pain       Mukesh Mccord is a 57 y.o. male who presents to the office today for evaluation of Abdominal Pain  .    HPI      Mukesh Mccord is a 57 y.o. male who presents to the office today for a follow-up evaluation of abdominal pain and constipation. The patient reports he is currently taking omeprazole 40 mg once daily for GERD and was previously taking FiberCon to help aid in constipation. He reports he has a kind of sharp almost like a stinging pain in his abdomen that is really deep. He states the pain is not where he can feel it or make it hurt worse by pressing on it. He reports as far as his bowels go he feels like he is never emptying. He reports he did a colonoscopy last year with Dr. Sharma. He states this is a different pain than last year. He states last year was diverticulitis. He reports the pain has been going on for about 4 weeks now. He reports when he is at home the pain seems to relax some and not bother him as much. He reports his grandfather was 40 yrs old when he  of metastatic cancer and his grandmother also had stomach cancer. He states he did have some colon polyps removed.     Review of Systems   Constitutional: Negative for fever.   HENT: Negative for trouble swallowing.    Eyes: Negative.    Respiratory: Negative.    Cardiovascular: Negative.    Gastrointestinal: Positive for abdominal distention, abdominal pain, anal bleeding and constipation. Negative for blood in stool, diarrhea, nausea and vomiting.   Endocrine: Negative.    Genitourinary: Negative.    Musculoskeletal: Negative.    Skin: Negative.    Allergic/Immunologic: Negative.    Neurological: Negative.    Hematological: Negative.    Psychiatric/Behavioral: Negative.        ACTIVE PROBLEMS:   Specialty Problems        Gastroenterology Problems    Fatty liver           PAST MEDICAL HISTORY:  Past Medical History:   Diagnosis Date   • Anal fistula    • Chronic  "prostatitis 10/23/2020   • Hypertension    • Leaky heart valve    • Levator syndrome    • Proctalgia fugax    • Prostatitis        SURGICAL HISTORY:  Past Surgical History:   Procedure Laterality Date   • COLONOSCOPY  2015    Dr. jack- colon polyps, did not have sedation due to fear       FAMILY HISTORY:  Family History   Problem Relation Age of Onset   • Heart disease Father    • Hypertension Father    • Heart disease Mother    • Hypertension Mother    • Cancer Mother        SOCIAL HISTORY:  Social History     Tobacco Use   • Smoking status: Never   • Smokeless tobacco: Never   Substance Use Topics   • Alcohol use: Never       CURRENT MEDICATION:    Current Outpatient Medications:   •  cyclobenzaprine (FLEXERIL) 10 MG tablet, Take 10 mg by mouth 3 (Three) Times a Day As Needed for Muscle Spasms., Disp: , Rfl:   •  losartan-hydrochlorothiazide (HYZAAR) 50-12.5 MG per tablet, Take 1 tablet by mouth Daily. FOR 30 DAYS, Disp: , Rfl:   •  Omega-3 Fatty Acids (OMEGA-3 PO), Take  by mouth., Disp: , Rfl:   •  omeprazole (priLOSEC) 40 MG capsule, Take 1 capsule by mouth Daily., Disp: 90 capsule, Rfl: 3  •  sildenafil (REVATIO) 20 MG tablet, Take 20 mg by mouth 3 (Three) Times a Day., Disp: , Rfl:   •  sildenafil (Viagra) 100 MG tablet, Take 1 tablet by mouth As Needed for Erectile Dysfunction., Disp: 30 tablet, Rfl: 1  •  sulfamethoxazole-trimethoprim (BACTRIM DS,SEPTRA DS) 800-160 MG per tablet, Take 1 tablet by mouth 2 (Two) Times a Day., Disp: , Rfl:   •  tamsulosin (FLOMAX) 0.4 MG capsule 24 hr capsule, Take 1 capsule by mouth Daily., Disp: , Rfl:   •  polyethylene glycol (MIRALAX) 17 GM/SCOOP powder, Take 17 g by mouth Daily., Disp: 527 g, Rfl: 11    ALLERGIES:  Penicillins    VISIT VITALS:  Ht 188 cm (74\")   Wt 113 kg (250 lb)   BMI 32.10 kg/m²   Physical Exam  Constitutional:       General: He is not in acute distress.     Appearance: Normal appearance. He is well-developed.   HENT:      Head: Normocephalic and " atraumatic.   Eyes:      Pupils: Pupils are equal, round, and reactive to light.   Cardiovascular:      Rate and Rhythm: Normal rate and regular rhythm.      Heart sounds: Normal heart sounds.   Pulmonary:      Effort: Pulmonary effort is normal. No respiratory distress.      Breath sounds: Normal breath sounds. No wheezing, rhonchi or rales.   Abdominal:      General: Abdomen is flat. Bowel sounds are normal. There is no distension.      Palpations: Abdomen is soft. There is no mass.      Tenderness: There is no abdominal tenderness. There is no guarding or rebound.      Hernia: No hernia is present.   Musculoskeletal:         General: No swelling. Normal range of motion.      Cervical back: Normal range of motion and neck supple.      Right lower leg: No edema.      Left lower leg: No edema.   Skin:     General: Skin is warm and dry.   Neurological:      Mental Status: He is alert and oriented to person, place, and time.   Psychiatric:         Attention and Perception: Attention normal.         Mood and Affect: Mood normal.         Speech: Speech normal.         Behavior: Behavior normal. Behavior is cooperative.         Thought Content: Thought content normal.         Assessment      1.)Abdominal Pain/constipation  Will get him started on MiraLAX       Diagnosis Plan   1. Chronic idiopathic constipation  polyethylene glycol (MIRALAX) 17 GM/SCOOP powder      2. Heartburn  omeprazole (priLOSEC) 40 MG capsule          Return if symptoms worsen or fail to improve.                   This document has been electronically signed by Benitez Granados PA-C  November 29, 2022 13:07 EST    Part of this note may be an electronic transcription/translation of spoken language to printed text using the Dragon Dictation System.    Transcribed from ambient dictation for Benitez Granados PA-C by Jacqui Beltran.  11/28/22   09:59 EST    Patient or patient representative verbalized consent to the visit recording.

## 2023-08-14 ENCOUNTER — HOSPITAL ENCOUNTER (OUTPATIENT)
Dept: GENERAL RADIOLOGY | Facility: HOSPITAL | Age: 58
Discharge: HOME | End: 2023-08-14
Payer: COMMERCIAL

## 2023-08-14 ENCOUNTER — OFFICE VISIT (OUTPATIENT)
Dept: ORTHOPEDIC SURGERY | Facility: CLINIC | Age: 58
End: 2023-08-14
Payer: COMMERCIAL

## 2023-08-14 ENCOUNTER — LAB (OUTPATIENT)
Dept: LAB | Facility: HOSPITAL | Age: 58
End: 2023-08-14
Payer: COMMERCIAL

## 2023-08-14 VITALS
WEIGHT: 250 LBS | HEIGHT: 74 IN | HEART RATE: 79 BPM | SYSTOLIC BLOOD PRESSURE: 157 MMHG | BODY MASS INDEX: 32.08 KG/M2 | DIASTOLIC BLOOD PRESSURE: 89 MMHG | OXYGEN SATURATION: 96 %

## 2023-08-14 DIAGNOSIS — M25.50 POLYARTHRALGIA: ICD-10-CM

## 2023-08-14 DIAGNOSIS — M79.642 BILATERAL HAND PAIN: ICD-10-CM

## 2023-08-14 DIAGNOSIS — M25.642 FINGER STIFFNESS, LEFT: ICD-10-CM

## 2023-08-14 DIAGNOSIS — M79.642 LEFT HAND PAIN: ICD-10-CM

## 2023-08-14 DIAGNOSIS — M10.9 GOUT, UNSPECIFIED CAUSE, UNSPECIFIED CHRONICITY, UNSPECIFIED SITE: ICD-10-CM

## 2023-08-14 DIAGNOSIS — M79.641 BILATERAL HAND PAIN: ICD-10-CM

## 2023-08-14 DIAGNOSIS — M79.642 LEFT HAND PAIN: Primary | ICD-10-CM

## 2023-08-14 PROCEDURE — 73130 X-RAY EXAM OF HAND: CPT

## 2023-08-14 PROCEDURE — 84550 ASSAY OF BLOOD/URIC ACID: CPT

## 2023-08-14 PROCEDURE — 99204 OFFICE O/P NEW MOD 45 MIN: CPT | Performed by: FAMILY MEDICINE

## 2023-08-14 PROCEDURE — 36415 COLL VENOUS BLD VENIPUNCTURE: CPT

## 2023-08-14 PROCEDURE — 86431 RHEUMATOID FACTOR QUANT: CPT

## 2023-08-14 PROCEDURE — 85652 RBC SED RATE AUTOMATED: CPT

## 2023-08-14 PROCEDURE — 80053 COMPREHEN METABOLIC PANEL: CPT

## 2023-08-14 PROCEDURE — 86140 C-REACTIVE PROTEIN: CPT

## 2023-08-14 PROCEDURE — 85025 COMPLETE CBC W/AUTO DIFF WBC: CPT

## 2023-08-15 LAB
ALBUMIN SERPL-MCNC: 4.4 G/DL (ref 3.5–5.2)
ALBUMIN/GLOB SERPL: 1.6 G/DL
ALP SERPL-CCNC: 98 U/L (ref 39–117)
ALT SERPL W P-5'-P-CCNC: 30 U/L (ref 1–41)
ANION GAP SERPL CALCULATED.3IONS-SCNC: 11.3 MMOL/L (ref 5–15)
AST SERPL-CCNC: 22 U/L (ref 1–40)
BASOPHILS # BLD AUTO: 0.04 10*3/MM3 (ref 0–0.2)
BASOPHILS NFR BLD AUTO: 0.6 % (ref 0–1.5)
BILIRUB SERPL-MCNC: 0.2 MG/DL (ref 0–1.2)
BUN SERPL-MCNC: 10 MG/DL (ref 6–20)
BUN/CREAT SERPL: 10.8 (ref 7–25)
CALCIUM SPEC-SCNC: 9.8 MG/DL (ref 8.6–10.5)
CHLORIDE SERPL-SCNC: 100 MMOL/L (ref 98–107)
CHROMATIN AB SERPL-ACNC: <10 IU/ML (ref 0–14)
CO2 SERPL-SCNC: 26.7 MMOL/L (ref 22–29)
CREAT SERPL-MCNC: 0.93 MG/DL (ref 0.76–1.27)
CRP SERPL-MCNC: 0.3 MG/DL (ref 0–0.5)
DEPRECATED RDW RBC AUTO: 39.7 FL (ref 37–54)
EGFRCR SERPLBLD CKD-EPI 2021: 95.2 ML/MIN/1.73
EOSINOPHIL # BLD AUTO: 0.08 10*3/MM3 (ref 0–0.4)
EOSINOPHIL NFR BLD AUTO: 1.3 % (ref 0.3–6.2)
ERYTHROCYTE [DISTWIDTH] IN BLOOD BY AUTOMATED COUNT: 12.9 % (ref 12.3–15.4)
ERYTHROCYTE [SEDIMENTATION RATE] IN BLOOD: 16 MM/HR (ref 0–20)
GLOBULIN UR ELPH-MCNC: 2.8 GM/DL
GLUCOSE SERPL-MCNC: 108 MG/DL (ref 65–99)
HCT VFR BLD AUTO: 46.8 % (ref 37.5–51)
HGB BLD-MCNC: 15.8 G/DL (ref 13–17.7)
IMM GRANULOCYTES # BLD AUTO: 0.01 10*3/MM3 (ref 0–0.05)
IMM GRANULOCYTES NFR BLD AUTO: 0.2 % (ref 0–0.5)
LYMPHOCYTES # BLD AUTO: 1.27 10*3/MM3 (ref 0.7–3.1)
LYMPHOCYTES NFR BLD AUTO: 20.3 % (ref 19.6–45.3)
MCH RBC QN AUTO: 29.2 PG (ref 26.6–33)
MCHC RBC AUTO-ENTMCNC: 33.8 G/DL (ref 31.5–35.7)
MCV RBC AUTO: 86.3 FL (ref 79–97)
MONOCYTES # BLD AUTO: 0.72 10*3/MM3 (ref 0.1–0.9)
MONOCYTES NFR BLD AUTO: 11.5 % (ref 5–12)
NEUTROPHILS NFR BLD AUTO: 4.15 10*3/MM3 (ref 1.7–7)
NEUTROPHILS NFR BLD AUTO: 66.1 % (ref 42.7–76)
NRBC BLD AUTO-RTO: 0 /100 WBC (ref 0–0.2)
PLATELET # BLD AUTO: 170 10*3/MM3 (ref 140–450)
PMV BLD AUTO: 13 FL (ref 6–12)
POTASSIUM SERPL-SCNC: 4.5 MMOL/L (ref 3.5–5.2)
PROT SERPL-MCNC: 7.2 G/DL (ref 6–8.5)
RBC # BLD AUTO: 5.42 10*6/MM3 (ref 4.14–5.8)
SODIUM SERPL-SCNC: 138 MMOL/L (ref 136–145)
URATE SERPL-MCNC: 5.8 MG/DL (ref 3.4–7)
WBC NRBC COR # BLD: 6.27 10*3/MM3 (ref 3.4–10.8)

## 2023-08-17 ENCOUNTER — OFFICE VISIT (OUTPATIENT)
Dept: ORTHOPEDIC SURGERY | Facility: CLINIC | Age: 58
End: 2023-08-17
Payer: COMMERCIAL

## 2023-08-17 VITALS
DIASTOLIC BLOOD PRESSURE: 88 MMHG | HEIGHT: 74 IN | OXYGEN SATURATION: 98 % | SYSTOLIC BLOOD PRESSURE: 156 MMHG | HEART RATE: 74 BPM | WEIGHT: 250 LBS | BODY MASS INDEX: 32.08 KG/M2

## 2023-08-17 DIAGNOSIS — M25.50 POLYARTHRALGIA: ICD-10-CM

## 2023-08-17 DIAGNOSIS — M25.642 FINGER STIFFNESS, LEFT: ICD-10-CM

## 2023-08-17 DIAGNOSIS — M79.642 LEFT HAND PAIN: Primary | ICD-10-CM

## 2023-08-17 PROCEDURE — 99213 OFFICE O/P EST LOW 20 MIN: CPT | Performed by: FAMILY MEDICINE

## 2023-08-22 NOTE — PROGRESS NOTES
New Patient Visit      Patient: Mukesh Mccord  YOB: 1965  Date of Encounter: 08/14/2023  PCP: Nellie Reyes APRN  Referring Provider: No ref. provider found     Subjective   Mukesh Mccord is a 58 y.o. male who presents to the office today for evaluation of Initial Evaluation and Pain of the Left Hand      Chief Complaint   Patient presents with    Left Hand - Initial Evaluation, Pain       HPI  The patient presents complaining of left hand pain since 5 to 6 years ago when he stuck himself in the palm of the hand with a meat thermometer.  Patient has a foreign body in his hand that thinks it may be related or a piece of the thermometer.  States that he has had pain ever since the injury but it is worse lately specifically with stiffness of the third and fourth fingers, difficulty gripping and triggering sensation intermittently.  Denies any numbness or tingling but gets pain all the time.  Questionable gout history in his family  Patient Active Problem List   Diagnosis    Persistent testicular pain    Frequency of urination    Disorder of prostate    Lower abdominal pain    Fatty liver    Chronic prostatitis       Past Medical History:   Diagnosis Date    Anal fistula     Chronic prostatitis 10/23/2020    Hypertension     Leaky heart valve     Levator syndrome     Proctalgia fugax     Prostatitis        Past Surgical History:   Procedure Laterality Date    COLONOSCOPY  2015    Dr. jack- colon polyps, did not have sedation due to fear       Family History   Problem Relation Age of Onset    Heart disease Father     Hypertension Father     Heart disease Mother     Hypertension Mother     Cancer Mother        Social History     Socioeconomic History    Marital status:    Tobacco Use    Smoking status: Never    Smokeless tobacco: Never   Vaping Use    Vaping Use: Never used   Substance and Sexual Activity    Alcohol use: Never    Drug use: Never    Sexual activity: Defer       Current  "Outpatient Medications   Medication Sig Dispense Refill    losartan-hydrochlorothiazide (HYZAAR) 50-12.5 MG per tablet Take 1 tablet by mouth Daily. FOR 30 DAYS      omeprazole (priLOSEC) 40 MG capsule Take 1 capsule by mouth Daily. 90 capsule 3    sildenafil (Viagra) 100 MG tablet Take 1 tablet by mouth As Needed for Erectile Dysfunction. 30 tablet 1    cyclobenzaprine (FLEXERIL) 10 MG tablet Take 1 tablet by mouth 3 (Three) Times a Day As Needed for Muscle Spasms.      Diclofenac Sodium (VOLTAREN) 1 % gel gel Apply 2 g topically to the appropriate area as directed 4 (Four) Times a Day As Needed (finger pain). 150 g 2    Omega-3 Fatty Acids (OMEGA-3 PO) Take  by mouth.      polyethylene glycol (MIRALAX) 17 GM/SCOOP powder Take 17 g by mouth Daily. 527 g 11    sildenafil (REVATIO) 20 MG tablet Take 1 tablet by mouth 3 (Three) Times a Day.      sulfamethoxazole-trimethoprim (BACTRIM DS,SEPTRA DS) 800-160 MG per tablet Take 1 tablet by mouth 2 (Two) Times a Day.      tamsulosin (FLOMAX) 0.4 MG capsule 24 hr capsule Take 1 capsule by mouth Daily.       No current facility-administered medications for this visit.       Allergies   Allergen Reactions    Penicillins Hives       Review of Systems   Constitutional:  Positive for activity change. Negative for fever.   Respiratory:  Negative for shortness of breath and wheezing.    Cardiovascular:  Negative for chest pain.   Musculoskeletal:  Positive for arthralgias and myalgias.   Skin:  Negative for color change and wound.   Neurological:  Negative for weakness and numbness.     Visit Vitals  /89 (BP Location: Left arm, Patient Position: Sitting, Cuff Size: Adult)   Pulse 79   Ht 188 cm (74.02\")   Wt 113 kg (250 lb)   SpO2 96%   BMI 32.08 kg/mý     58 y.o.male  Physical Exam  Vitals and nursing note reviewed.   Constitutional:       General: He is not in acute distress.     Appearance: Normal appearance.   Pulmonary:      Effort: Pulmonary effort is normal. No " respiratory distress.   Musculoskeletal:      Left hand: Tenderness and bony tenderness present. Decreased range of motion. Normal strength. Normal sensation. Normal pulse.        Hands:    Skin:     General: Skin is warm and dry.      Findings: No erythema.   Neurological:      General: No focal deficit present.      Mental Status: He is alert.      Sensory: No sensory deficit.      Motor: No weakness.       Radiology Results:    XR Hand 3+ View Bilateral    Result Date: 8/14/2023  1.  No acute osseous or articular abnormality. 2.  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  This report was finalized on 8/14/2023 10:45 AM by Dr. Solomon Evans MD.     Metallic foreign body noted is not in the same area where the patient says he was stabbed with a meat thermometer and is also not in the area where he is having his pain.    Assessment & Plan   Diagnoses and all orders for this visit:    1. Left hand pain (Primary)  -     CBC & Differential; Future  -     Comprehensive Metabolic Panel; Future  -     C-reactive Protein; Future  -     Uric Acid; Future  -     Rheumatoid Factor; Future  -     Sedimentation Rate; Future  -     Diclofenac Sodium (VOLTAREN) 1 % gel gel; Apply 2 g topically to the appropriate area as directed 4 (Four) Times a Day As Needed (finger pain).  Dispense: 150 g; Refill: 2    2. Bilateral hand pain  -     XR Hand 3+ View Bilateral; Future  -     CBC & Differential; Future  -     Comprehensive Metabolic Panel; Future  -     C-reactive Protein; Future  -     Uric Acid; Future  -     Rheumatoid Factor; Future  -     Sedimentation Rate; Future  -     Diclofenac Sodium (VOLTAREN) 1 % gel gel; Apply 2 g topically to the appropriate area as directed 4 (Four) Times a Day As Needed (finger pain).  Dispense: 150 g; Refill: 2    3. Finger stiffness, left  -     CBC & Differential; Future  -     Comprehensive Metabolic Panel; Future  -     C-reactive Protein; Future  -      Uric Acid; Future  -     Rheumatoid Factor; Future  -     Sedimentation Rate; Future  -     Diclofenac Sodium (VOLTAREN) 1 % gel gel; Apply 2 g topically to the appropriate area as directed 4 (Four) Times a Day As Needed (finger pain).  Dispense: 150 g; Refill: 2    4. Gout, unspecified cause, unspecified chronicity, unspecified site  -     CBC & Differential; Future  -     Comprehensive Metabolic Panel; Future  -     C-reactive Protein; Future  -     Uric Acid; Future  -     Rheumatoid Factor; Future  -     Sedimentation Rate; Future  -     Diclofenac Sodium (VOLTAREN) 1 % gel gel; Apply 2 g topically to the appropriate area as directed 4 (Four) Times a Day As Needed (finger pain).  Dispense: 150 g; Refill: 2    5. Polyarthralgia  -     CBC & Differential; Future  -     Comprehensive Metabolic Panel; Future  -     C-reactive Protein; Future  -     Uric Acid; Future  -     Rheumatoid Factor; Future  -     Sedimentation Rate; Future  -     Diclofenac Sodium (VOLTAREN) 1 % gel gel; Apply 2 g topically to the appropriate area as directed 4 (Four) Times a Day As Needed (finger pain).  Dispense: 150 g; Refill: 2         MEDS ORDERED DURING VISIT:  New Medications Ordered This Visit   Medications    Diclofenac Sodium (VOLTAREN) 1 % gel gel     Sig: Apply 2 g topically to the appropriate area as directed 4 (Four) Times a Day As Needed (finger pain).     Dispense:  150 g     Refill:  2     MEDICATION ISSUES:  Discussed medication options and treatment plan of prescribed medication as well as the risks, benefits, and side effects including potential falls, possible impaired driving and metabolic adversities among others. Patient is agreeable to call the office with any worsening of symptoms or onset of side effects. Patient is agreeable to call 911 or go to the nearest ER should he/she begin having SI/HI.     Discussion:  Metallic foreign body is present in his hand but is not in the same area as the pain or the previous  injury.  Is unlikely to be related to the meat thermometer incident.  Get blood work to rule out gout flareup.  Patient was given exercises for the hand as well as topical Voltaren to use up to 4 times a day for pain relief.  There is no triggering at this time but patient may benefit from a steroid injected locally to help with his pain.  He will follow-up for this procedure.             This document has been electronically signed by Chance Jacques DO   August 22, 2023 00:19 EDT    Part of this note may be an electronic transcription/translation of spoken language to printed text using the Dragon Dictation System.

## 2023-08-24 NOTE — PROGRESS NOTES
"Follow Up Visit      Patient: Mukesh Mccord  YOB: 1965  Date of Encounter: 08/17/2023  PCP: Nellie Reyes APRN  Referring Provider: No ref. provider found     Subjective   Mukesh Mccord is a 58 y.o. male who presents to the office today for evaluation of Follow-up and Pain of the Left Hand      Chief Complaint   Patient presents with    Left Hand - Follow-up, Pain       HPI  Patient presents for follow-up for left hand pain with plans to do trigger finger injection today.  However patient notes that using topical Voltaren has significantly improved his pain and taking away any remaining triggering.  He is much better.  Pain not completely resolved but is mostly gone and continuing to improve  Patient Active Problem List   Diagnosis    Persistent testicular pain    Frequency of urination    Disorder of prostate    Lower abdominal pain    Fatty liver    Chronic prostatitis       Past Medical History:   Diagnosis Date    Anal fistula     Chronic prostatitis 10/23/2020    Hypertension     Leaky heart valve     Levator syndrome     Proctalgia fugax     Prostatitis        Allergies   Allergen Reactions    Penicillins Hives       Review of Systems   Constitutional:  Negative for activity change and fever.   Respiratory:  Negative for shortness of breath and wheezing.    Cardiovascular:  Negative for chest pain.   Musculoskeletal:  Positive for arthralgias and myalgias.   Skin:  Negative for color change and wound.   Neurological:  Negative for weakness and numbness.     Visit Vitals  /88 (BP Location: Left arm, Patient Position: Sitting, Cuff Size: Adult)   Pulse 74   Ht 188 cm (74.02\")   Wt 113 kg (250 lb)   SpO2 98%   BMI 32.08 kg/mý     58 y.o.male  Physical Exam  Vitals and nursing note reviewed.   Constitutional:       General: He is not in acute distress.     Appearance: Normal appearance.   Pulmonary:      Effort: Pulmonary effort is normal. No respiratory distress.   Musculoskeletal:    "   Left hand: Tenderness and bony tenderness present. Normal range of motion. Normal strength. Normal sensation. Normal pulse.        Hands:       Comments: Mild soreness of fingers as indicated on diagram much improved from previous.  No palpable triggering   Skin:     General: Skin is warm and dry.      Findings: No erythema.   Neurological:      General: No focal deficit present.      Mental Status: He is alert.      Sensory: No sensory deficit.      Motor: No weakness.       Radiology Results:    XR Hand 3+ View Bilateral    Result Date: 8/14/2023  1.  No acute osseous or articular abnormality. 2.  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  This report was finalized on 8/14/2023 10:45 AM by Dr. Solomon Evans MD.       Assessment & Plan   Diagnoses and all orders for this visit:    1. Left hand pain (Primary)    2. Finger stiffness, left    3. Polyarthralgia         MEDS ORDERED DURING VISIT:  No orders of the defined types were placed in this encounter.    MEDICATION ISSUES:  Discussed medication options and treatment plan of prescribed medication as well as the risks, benefits, and side effects including potential falls, possible impaired driving and metabolic adversities among others. Patient is agreeable to call the office with any worsening of symptoms or onset of side effects. Patient is agreeable to call 911 or go to the nearest ER should he/she begin having SI/HI.     Discussion:  Patient is doing very well right now.  I do not recommend any injections at this time.  Continue home exercises and use of topical Voltaren.  Follow-up as needed if the hand starts hurting again           This document has been electronically signed by Chance Jacques DO   August 23, 2023 23:41 EDT    Dictated Utilizing Dragon Dictation: Part of this note may be an electronic transcription/translation of spoken language to printed text using the Dragon Dictation System.

## 2023-09-26 ENCOUNTER — TELEPHONE (OUTPATIENT)
Dept: CARDIOLOGY | Facility: CLINIC | Age: 58
End: 2023-09-26
Payer: COMMERCIAL

## 2023-09-26 NOTE — TELEPHONE ENCOUNTER
Caller: Mukesh Mccord    Relationship: Self    Best call back number: [phone]    Who is your current provider: PATIENT SCHEDULED WITH DR. HAYES    Who would you like your new provider to be: CLARA LARES    What are your reasons for transferring care: PATIENT WOULD LIKE TO BE SEEN BY CLARA LARES.     Additional notes:

## 2023-10-30 ENCOUNTER — OFFICE VISIT (OUTPATIENT)
Dept: CARDIOLOGY | Facility: CLINIC | Age: 58
End: 2023-10-30
Payer: COMMERCIAL

## 2023-10-30 VITALS
BODY MASS INDEX: 32.75 KG/M2 | HEART RATE: 78 BPM | OXYGEN SATURATION: 97 % | DIASTOLIC BLOOD PRESSURE: 80 MMHG | RESPIRATION RATE: 16 BRPM | HEIGHT: 74 IN | WEIGHT: 255.2 LBS | SYSTOLIC BLOOD PRESSURE: 130 MMHG

## 2023-10-30 DIAGNOSIS — R07.2 PRECORDIAL PAIN: Primary | ICD-10-CM

## 2023-10-30 DIAGNOSIS — I10 ESSENTIAL HYPERTENSION: ICD-10-CM

## 2023-10-30 PROCEDURE — 1160F RVW MEDS BY RX/DR IN RCRD: CPT | Performed by: INTERNAL MEDICINE

## 2023-10-30 PROCEDURE — 1159F MED LIST DOCD IN RCRD: CPT | Performed by: INTERNAL MEDICINE

## 2023-10-30 PROCEDURE — 99204 OFFICE O/P NEW MOD 45 MIN: CPT | Performed by: INTERNAL MEDICINE

## 2023-10-30 PROCEDURE — 93000 ELECTROCARDIOGRAM COMPLETE: CPT | Performed by: INTERNAL MEDICINE

## 2023-10-30 RX ORDER — ERGOCALCIFEROL (VITAMIN D2) 10 MCG
400 TABLET ORAL DAILY
COMMUNITY

## 2023-10-30 RX ORDER — ASPIRIN 81 MG/1
81 TABLET ORAL DAILY
Qty: 30 TABLET | Refills: 2 | Status: SHIPPED | OUTPATIENT
Start: 2023-10-30

## 2023-10-30 RX ORDER — IBUPROFEN 800 MG/1
800 TABLET ORAL EVERY 6 HOURS PRN
COMMUNITY

## 2023-10-30 NOTE — LETTER
October 30, 2023     Qiana Tinoco PA-C  [address] [place], [place] [id number]    Patient: Mukesh Mccord   YOB: 1965   Date of Visit: 10/30/2023     Dear Qiana:       Thank you for referring Mukesh Mccord to me for evaluation. Below are the relevant portions of my assessment and plan of care.    If you have questions, please do not hesitate to call me. I look forward to following Mukesh along with you.         Sincerely,        Alexi Lovett MD        CC: No Recipients    Alexi Lovett MD  10/30/23 1250  Sign when Signing Visit  Qiana Tinoco PA-C  Mukesh Mccord  1965  10/30/2023    Patient Active Problem List   Diagnosis   • Persistent testicular pain   • Frequency of urination   • Disorder of prostate   • Lower abdominal pain   • Fatty liver   • Chronic prostatitis       Dear Qiana:    Subjective    Mukesh Mccord is a 58 y.o. male with the problems as listed above, presents    Chief complaint: Recent chest pains.    History of Present Illness: Mr. Silveira is a pleasant 58-year-old  male with no history of known heart disease or coronary artery disease, has history of hypertension, nondiabetic, non-smoker, presents with complaints of having had some episodes of chest pains in early part of September.  He described this pains as being felt as dull pains across his chest that rated a 6-7 out of 10 on the pain scale at that time.  This apparently lasted for about 30 to 40 minutes and resolve spontaneously.  There is no associated shortness of breath or sweating.  He has some intermittent dyspnea with moderate exertion, PND, orthopnea pedal edema.  He does admit to drinking a lot of coffee through the day as well as caffeinated soft drinks including Coke, malleoli etc.  He denies any family history of premature heart disease.    Cardiac risk factors:hypertension, Obesity, and age and male gender.    Allergies   Allergen Reactions   • Penicillins  Hives   :    Current Outpatient Medications:   •  losartan-hydrochlorothiazide (HYZAAR) 50-12.5 MG per tablet, Take 1 tablet by mouth Daily. FOR 30 DAYS, Disp: , Rfl:   •  omeprazole (priLOSEC) 40 MG capsule, Take 1 capsule by mouth Daily., Disp: 90 capsule, Rfl: 3  •  Vitamin D, Cholecalciferol, (CHOLECALCIFEROL) 10 MCG (400 UNIT) tablet, Take 1 tablet by mouth Daily., Disp: , Rfl:   •  aspirin 81 MG EC tablet, Take 1 tablet by mouth Daily., Disp: 30 tablet, Rfl: 2  •  ibuprofen (ADVIL,MOTRIN) 800 MG tablet, Take 1 tablet by mouth Every 6 (Six) Hours As Needed., Disp: , Rfl:     Past Medical History:   Diagnosis Date   • Anal fistula    • Chronic prostatitis 10/23/2020   • Hypertension    • Leaky heart valve    • Levator syndrome    • Proctalgia fugax    • Prostatitis      Past Surgical History:   Procedure Laterality Date   • COLONOSCOPY      Dr. jack- colon polyps, did not have sedation due to fear     Family History   Problem Relation Age of Onset   • Heart disease Father    • Hypertension Father    • Heart disease Mother    • Hypertension Mother    • Cancer Mother      Social History     Tobacco Use   • Smoking status: Former     Packs/day: 1     Types: Cigarettes     Quit date:      Years since quittin.8   • Smokeless tobacco: Former     Quit date:    Vaping Use   • Vaping Use: Never used   Substance Use Topics   • Alcohol use: Never   • Drug use: Never     Review of Systems   Constitutional: Positive for malaise/fatigue.   HENT:  Positive for hearing loss.    Eyes:  Positive for blurred vision.   Cardiovascular:  Positive for chest pain and palpitations.   Hematologic/Lymphatic: Bruises/bleeds easily.   Skin:  Positive for poor wound healing.   Musculoskeletal:  Positive for arthritis, back pain, joint pain, muscle cramps, myalgias and stiffness.   Genitourinary:  Positive for frequency.   Neurological:  Positive for numbness and paresthesias.     Objective  Blood pressure 130/80, pulse 78,  "resp. rate 16, height 188 cm (74\"), weight 116 kg (255 lb 3.2 oz), SpO2 97%.  Body mass index is 32.77 kg/m².    Vitals reviewed.   Constitutional:       Appearance: Well-developed.   Eyes:      Conjunctiva/sclera: Conjunctivae normal.   HENT:      Head: Normocephalic.   Neck:      Thyroid: No thyromegaly.      Vascular: No JVD.      Trachea: No tracheal deviation.   Pulmonary:      Effort: No respiratory distress.      Breath sounds: Normal breath sounds. No wheezing. No rales.   Cardiovascular:      PMI at left midclavicular line. Normal rate. Regular rhythm. Normal S1. Normal S2.       Murmurs: There is no murmur.      No gallop.  No click. No rub.   Pulses:     Carotid: 2+ bilaterally.     Radial: 2+ bilaterally.     Femoral: 2+ bilaterally.     Dorsalis pedis: 2+ bilaterally.     Posterior tibial: 2+ bilaterally.  Edema:     Peripheral edema absent.   Abdominal:      General: Bowel sounds are normal.      Palpations: Abdomen is soft. There is no abdominal mass.      Tenderness: There is no abdominal tenderness.   Musculoskeletal:      Cervical back: Normal range of motion and neck supple. Skin:     General: Skin is warm and dry.   Neurological:      Mental Status: Alert and oriented to person, place, and time.      Cranial Nerves: No cranial nerve deficit.         Lab Results   Component Value Date     08/14/2023    K 4.5 08/14/2023     08/14/2023    CO2 26.7 08/14/2023    BUN 10 08/14/2023    CREATININE 0.93 08/14/2023    GLUCOSE 108 (H) 08/14/2023    CALCIUM 9.8 08/14/2023    AST 22 08/14/2023    ALT 30 08/14/2023    ALKPHOS 98 08/14/2023     No results found for: \"CKTOTAL\"  Lab Results   Component Value Date    WBC 6.27 08/14/2023    HGB 15.8 08/14/2023    HCT 46.8 08/14/2023     08/14/2023       Lab Results   Component Value Date    PSA 1.130 06/28/2021        ECG 12 Lead    Date/Time: 10/30/2023 9:23 AM  Performed by: Alexi Lovett MD    Authorized by: Alexi Lovett MD  Previous " ECG: no previous ECG available  Rhythm: sinus rhythm  BPM: 77  Conduction: left anterior fascicular block            Assessment & Plan   Diagnosis Plan   1. Precordial pain  Adult Stress Echo W/ Cont or Stress Agent if Necessary Per Protocol      2. Essential hypertension, seems controlled.            Recommendations  Orders Placed This Encounter   Procedures   • ECG 12 Lead      We will evaluate further with a stress echo study.  In the meantime I have asked him to take enteric-coated aspirin 81 mg a day.    Return in about 5 weeks (around 12/4/2023).    As always,  Qiana  I appreciate very much the opportunity to participate in the cardiovascular care of your patients. Please do not hesitate to call me with any questions with regards to Mukesh Mccord's evaluation and management.     With Best Regards,        Alexi Lovett MD, [facility]    Please note that portions of this note were completed with a voice recognition program.

## 2023-10-30 NOTE — PROGRESS NOTES
Qiana Tinoco PA-C  Mukesh Mccord  1965  10/30/2023    Patient Active Problem List   Diagnosis    Persistent testicular pain    Frequency of urination    Disorder of prostate    Lower abdominal pain    Fatty liver    Chronic prostatitis       Dear Qiana:    Subjective     Mukesh Mccord is a 58 y.o. male with the problems as listed above, presents    Chief complaint: Recent chest pains.    History of Present Illness: Mr. Silveira is a pleasant 58-year-old  male with no history of known heart disease or coronary artery disease, has history of hypertension, nondiabetic, non-smoker, presents with complaints of having had some episodes of chest pains in early part of September.  He described this pains as being felt as dull pains across his chest that rated a 6-7 out of 10 on the pain scale at that time.  This apparently lasted for about 30 to 40 minutes and resolve spontaneously.  There is no associated shortness of breath or sweating.  He has some intermittent dyspnea with moderate exertion, PND, orthopnea pedal edema.  He does admit to drinking a lot of coffee through the day as well as caffeinated soft drinks including Coke, malleoli etc.  He denies any family history of premature heart disease.    Cardiac risk factors:hypertension, Obesity, and age and male gender.    Allergies   Allergen Reactions    Penicillins Hives   :    Current Outpatient Medications:     losartan-hydrochlorothiazide (HYZAAR) 50-12.5 MG per tablet, Take 1 tablet by mouth Daily. FOR 30 DAYS, Disp: , Rfl:     omeprazole (priLOSEC) 40 MG capsule, Take 1 capsule by mouth Daily., Disp: 90 capsule, Rfl: 3    Vitamin D, Cholecalciferol, (CHOLECALCIFEROL) 10 MCG (400 UNIT) tablet, Take 1 tablet by mouth Daily., Disp: , Rfl:     aspirin 81 MG EC tablet, Take 1 tablet by mouth Daily., Disp: 30 tablet, Rfl: 2    ibuprofen (ADVIL,MOTRIN) 800 MG tablet, Take 1 tablet by mouth Every 6 (Six) Hours As Needed., Disp: , Rfl:     Past  "Medical History:   Diagnosis Date    Anal fistula     Chronic prostatitis 10/23/2020    Hypertension     Leaky heart valve     Levator syndrome     Proctalgia fugax     Prostatitis      Past Surgical History:   Procedure Laterality Date    COLONOSCOPY  2015    Dr. jack- colon polyps, did not have sedation due to fear     Family History   Problem Relation Age of Onset    Heart disease Father     Hypertension Father     Heart disease Mother     Hypertension Mother     Cancer Mother      Social History     Tobacco Use    Smoking status: Former     Packs/day: 1     Types: Cigarettes     Quit date:      Years since quittin.8    Smokeless tobacco: Former     Quit date:    Vaping Use    Vaping Use: Never used   Substance Use Topics    Alcohol use: Never    Drug use: Never     Review of Systems   Constitutional: Positive for malaise/fatigue.   HENT:  Positive for hearing loss.    Eyes:  Positive for blurred vision.   Cardiovascular:  Positive for chest pain and palpitations.   Hematologic/Lymphatic: Bruises/bleeds easily.   Skin:  Positive for poor wound healing.   Musculoskeletal:  Positive for arthritis, back pain, joint pain, muscle cramps, myalgias and stiffness.   Genitourinary:  Positive for frequency.   Neurological:  Positive for numbness and paresthesias.     Objective   Blood pressure 130/80, pulse 78, resp. rate 16, height 188 cm (74\"), weight 116 kg (255 lb 3.2 oz), SpO2 97%.  Body mass index is 32.77 kg/m².    Vitals reviewed.   Constitutional:       Appearance: Well-developed.   Eyes:      Conjunctiva/sclera: Conjunctivae normal.   HENT:      Head: Normocephalic.   Neck:      Thyroid: No thyromegaly.      Vascular: No JVD.      Trachea: No tracheal deviation.   Pulmonary:      Effort: No respiratory distress.      Breath sounds: Normal breath sounds. No wheezing. No rales.   Cardiovascular:      PMI at left midclavicular line. Normal rate. Regular rhythm. Normal S1. Normal S2.       Murmurs: " "There is no murmur.      No gallop.  No click. No rub.   Pulses:     Carotid: 2+ bilaterally.     Radial: 2+ bilaterally.     Femoral: 2+ bilaterally.     Dorsalis pedis: 2+ bilaterally.     Posterior tibial: 2+ bilaterally.  Edema:     Peripheral edema absent.   Abdominal:      General: Bowel sounds are normal.      Palpations: Abdomen is soft. There is no abdominal mass.      Tenderness: There is no abdominal tenderness.   Musculoskeletal:      Cervical back: Normal range of motion and neck supple. Skin:     General: Skin is warm and dry.   Neurological:      Mental Status: Alert and oriented to person, place, and time.      Cranial Nerves: No cranial nerve deficit.         Lab Results   Component Value Date     08/14/2023    K 4.5 08/14/2023     08/14/2023    CO2 26.7 08/14/2023    BUN 10 08/14/2023    CREATININE 0.93 08/14/2023    GLUCOSE 108 (H) 08/14/2023    CALCIUM 9.8 08/14/2023    AST 22 08/14/2023    ALT 30 08/14/2023    ALKPHOS 98 08/14/2023     No results found for: \"CKTOTAL\"  Lab Results   Component Value Date    WBC 6.27 08/14/2023    HGB 15.8 08/14/2023    HCT 46.8 08/14/2023     08/14/2023       Lab Results   Component Value Date    PSA 1.130 06/28/2021        ECG 12 Lead    Date/Time: 10/30/2023 9:23 AM  Performed by: Alexi Lovett MD    Authorized by: Alexi Lovett MD  Previous ECG: no previous ECG available  Rhythm: sinus rhythm  BPM: 77  Conduction: left anterior fascicular block            Assessment & Plan    Diagnosis Plan   1. Precordial pain  Adult Stress Echo W/ Cont or Stress Agent if Necessary Per Protocol      2. Essential hypertension, seems controlled.            Recommendations  Orders Placed This Encounter   Procedures    ECG 12 Lead      We will evaluate further with a stress echo study.  In the meantime I have asked him to take enteric-coated aspirin 81 mg a day.    Return in about 5 weeks (around 12/4/2023).    As always,  Qiana  I appreciate very much " the opportunity to participate in the cardiovascular care of your patients. Please do not hesitate to call me with any questions with regards to Mukesh Mccord's evaluation and management.     With Best Regards,        Alexi Lovett MD, FACC    Please note that portions of this note were completed with a voice recognition program.

## 2024-04-09 RX ORDER — HYDROGEN PEROXIDE 2.65 ML/100ML
81 LIQUID ORAL; TOPICAL DAILY
Qty: 90 TABLET | Refills: 0 | Status: SHIPPED | OUTPATIENT
Start: 2024-04-09